# Patient Record
Sex: FEMALE | Race: OTHER | URBAN - METROPOLITAN AREA
[De-identification: names, ages, dates, MRNs, and addresses within clinical notes are randomized per-mention and may not be internally consistent; named-entity substitution may affect disease eponyms.]

---

## 2022-08-22 ENCOUNTER — OFFICE VISIT (OUTPATIENT)
Dept: OBGYN CLINIC | Facility: CLINIC | Age: 27
End: 2022-08-22
Payer: COMMERCIAL

## 2022-08-22 VITALS
HEIGHT: 64 IN | DIASTOLIC BLOOD PRESSURE: 58 MMHG | SYSTOLIC BLOOD PRESSURE: 110 MMHG | WEIGHT: 146 LBS | BODY MASS INDEX: 24.92 KG/M2

## 2022-08-22 DIAGNOSIS — B96.89 BACTERIAL VAGINOSIS: ICD-10-CM

## 2022-08-22 DIAGNOSIS — N76.0 BACTERIAL VAGINOSIS: ICD-10-CM

## 2022-08-22 DIAGNOSIS — B37.9 YEAST INFECTION: ICD-10-CM

## 2022-08-22 DIAGNOSIS — N89.8 VAGINAL DISCHARGE: Primary | ICD-10-CM

## 2022-08-22 LAB — TRICHOMONAS SCREEN: NEGATIVE

## 2022-08-22 PROCEDURE — 87808 TRICHOMONAS ASSAY W/OPTIC: CPT | Performed by: OBSTETRICS & GYNECOLOGY

## 2022-08-22 PROCEDURE — 99203 OFFICE O/P NEW LOW 30 MIN: CPT | Performed by: OBSTETRICS & GYNECOLOGY

## 2022-08-22 PROCEDURE — 87205 SMEAR GRAM STAIN: CPT | Performed by: OBSTETRICS & GYNECOLOGY

## 2022-08-22 PROCEDURE — 3008F BODY MASS INDEX DOCD: CPT | Performed by: OBSTETRICS & GYNECOLOGY

## 2022-08-22 PROCEDURE — 87106 FUNGI IDENTIFICATION YEAST: CPT | Performed by: OBSTETRICS & GYNECOLOGY

## 2022-08-22 PROCEDURE — 3078F DIAST BP <80 MM HG: CPT | Performed by: OBSTETRICS & GYNECOLOGY

## 2022-08-22 PROCEDURE — 3074F SYST BP LT 130 MM HG: CPT | Performed by: OBSTETRICS & GYNECOLOGY

## 2022-08-22 RX ORDER — FLUCONAZOLE 150 MG/1
150 TABLET ORAL
Qty: 3 TABLET | Refills: 0 | Status: SHIPPED | OUTPATIENT
Start: 2022-08-22

## 2022-08-22 RX ORDER — MULTIVITAMIN
1 CAPSULE ORAL DAILY
COMMUNITY

## 2022-08-22 RX ORDER — METRONIDAZOLE 500 MG/1
500 TABLET ORAL 2 TIMES DAILY
Qty: 14 TABLET | Refills: 0 | Status: SHIPPED | OUTPATIENT
Start: 2022-08-22 | End: 2022-08-29

## 2022-08-24 LAB
GENITAL VAGINOSIS SCREEN: NEGATIVE
TRICHOMONAS SCREEN: NEGATIVE

## 2024-04-04 NOTE — PATIENT INSTRUCTIONS
Baking soda Sitz bath:  Add 4-5 tablespoons of baking soda to a bath and soak for 10 minutes up to three times a day.  If you have a \"sitz bath\" instead of a full bath tub, add only 2 tablespoons of baking soda.

## 2024-04-04 NOTE — PROGRESS NOTES
RETURN GYN OFFICE VISIT  [facility]    CHIEF COMPLAINT:    Chief Complaint   Patient presents with    Vaginal Problem     Pt states she's tried using vaginal probiotics, over the counter medication and nothing seems to work, states she just finished her menstrual and believes the yeast infection has cleared up but wants to f/u       HISTORY OF PRESENT ILLNESS:    SOLO is a 29 year old female  here for   Vaginal discharge  All the time - sometimes green, sometimes white - sometimes creamy, sometimes chunky  + odor - sour / musty    Sometimes gets itchy / irritated - tries to wipe with a wet cloth to keep as clean as possible.    No bleeding.   No sex since November - sometimes a little pink with wiping after.    Just finished her period and feels ok now, doesn't really see much of the discharge today. No itching or irritation.    REVIEW OF SYSTEMS:   CONSTITUTIONAL:  negative for fevers, chills, sweats and fatigue  GASTROINTESTINAL:  negative for nausea, vomiting, blood in stool, constipation, diarrhea and abdominal pain  GENITOURINARY: negative for no dysuria, urgency or frequency; no urinary incontinence; no hematuria  SKIN:  negative for  rash, skin lesion and pruritus  ENDOCRINE:  negative for acne, fatigue, weight gain and weight loss  BEHAVIOR/PSYCH:  negative for depressed mood, anhedonia and anxiety    CURRENT MEDICATIONS:      Current Outpatient Medications:     fluconazole 150 MG Oral Tab, Take 1 tablet (150 mg total) by mouth every third day as needed., Disp: 3 tablet, Rfl: 0    Multiple Vitamin (MULTIVITAMINS) Oral Cap, Take 1 capsule by mouth daily., Disp: , Rfl:     PAST MEDICAL, SOCIAL AND FAMILY HISTORY:    Past Medical History:   Diagnosis Date    Patient denies medical problems      Past Surgical History:   Procedure Laterality Date    PATIENT DENIES ANY SURGICAL HISTORY       Family History   Problem Relation Age of Onset    No Known Problems Father     Other (Other) Mother          thyroid issue.    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      CONSTITUTIONAL:  Awake, alert, cooperative, no apparent distress  EYES: sclera clear and conjunctiva normal  GASTROINTESTINAL:  soft, non-distended, non-tender, no masses palpated  GENITAL/URINARY:    External Genitalia:  General appearance; normal, Hair distribution; normal, Lesions absent   Urethral Meatus:  Lesions absent, Prolapse absent  Bladder:  Tenderness absent, Cystocele absent  Vagina:  Discharge creamy with no noted odor, Lesions absent, Pelvic support normal  Cervix:  Lesions absent, Discharge absent, Tenderness absent  Uterus:  Size normal, Masses absent, Tenderness absent  Adnexa:  Masses absent, Tenderness absent  Anus/Perineum:  Lesions absent  SKIN:  No rashes  PSYCH:  Affect Normal      ASSESSMENT AND PLAN:  1. Vaginal discharge  - swab collected  - rx for fluconazole to have on hand if itching returns  - recommend baking soda sitz baths while waiting for swab results.  - YAEL VAGINAL ID; Future        Cassidy Tripp,

## 2024-04-08 NOTE — TELEPHONE ENCOUNTER
LMTCB    +VIKOR:  Candida albicans: 0.82%  Candid parapsilosis, tropicalis, albicans: 0.82%    Pt treated with:  Diflucan x2

## 2024-08-10 NOTE — ED PROVIDER NOTES
Patient Seen in: [facility]      History     Chief Complaint   Patient presents with    Urinary Symptoms     Stated Complaint: Urinary Symptoms - Sharp pain when urinating & foul odor    Subjective:   28 y/o female with unremarkable medical history complains of dysuria, pressure, urinary frequency and left flank pain for the past 3 days.  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   Gastrointestinal:  Negative for abdominal pain, nausea and vomiting.   Genitourinary:  Positive for dysuria and flank pain. Negative for frequency, hematuria and urgency.   All other systems reviewed and are negative.      Positive for stated Chief Complaint: Urinary Symptoms    Other systems are as noted in HPI.  Constitutional and vital signs reviewed.      All other systems reviewed and negative except as noted above.    Physical Exam     ED Triage Vitals [08/10/24 0925]   /63   Pulse 67   Resp 18   Temp 97.9 °F (36.6 °C)   Temp src Temporal   SpO2 100 %   O2 Device None (Room air)       Current Vitals:   Vital Signs  BP: 110/63  Pulse: 67  Resp: 18  Temp: 97.9 °F (36.6 °C)  Temp src: Temporal    Oxygen  Therapy  SpO2: 100 %  O2 Device: None (Room air)            Physical Exam  Vitals and nursing note reviewed.   Constitutional:       Appearance: Normal appearance.   Cardiovascular:      Rate and Rhythm: Normal rate and regular rhythm.   Pulmonary:      Effort: Pulmonary effort is normal.      Breath sounds: Normal breath sounds.   Abdominal:      General: Bowel sounds are normal.      Tenderness: There is no abdominal tenderness. There is no right CVA tenderness or left CVA tenderness.   Skin:     General: Skin is warm and dry.      Capillary Refill: Capillary refill takes less than 2 seconds.   Neurological:      Mental Status: She is alert and oriented to person, place, and time.               ED Course     Labs Reviewed   [facility] POCT URINALYSIS DIPSTICK - Abnormal; Notable for the following components:       Result Value    Blood, Urine Trace-Intact (*)     Leukocyte esterase urine Trace (*)     All other components within normal limits   POCT PREGNANCY URINE - Normal   URINE CULTURE, ROUTINE                      MDM                                         Medical Decision Making  Patient is well-appearing. In NAD  Abdomen benign.  No CVA tenderness present on exam.  blood and leukocytes esterase were present in urine dip.  Urine culture pending.    RX Bactrim and Diflucan. Patient has Jainism restrictions that limit use of capsules due to possible gelatin. Bactrim tablets sent   I discussed differentials with patient including but not limited to OAB vs UTI vs pyelonephritis vs renal calculi.     F/U with PCP. Return/ED precautions discussed.      Problems Addressed:  Acute cystitis without hematuria: acute illness or injury    Amount and/or Complexity of Data Reviewed  Labs: ordered. Decision-making details documented in ED Course.        Disposition and Plan     Clinical Impression:  1. Acute cystitis without hematuria         Disposition:  Discharge  8/10/2024 10:02 am    Follow-up:  Ave Spencer MD  [address] [place] [address]  [address] [address]  [phone]      or follow up with your Primary Doctor          Medications Prescribed:  Discharge Medication List as of 8/10/2024  9:57 AM

## 2024-08-28 NOTE — TELEPHONE ENCOUNTER
Received a call from patient requesting to speak with a nurse for a possible UTI  & yeast infection .  Also patient will like to ask if she can be prescribe fluconazole 150 MG Oral Tab .    Please assist .

## 2024-08-28 NOTE — TELEPHONE ENCOUNTER
RN spoke with pt. Pt feels like her UTI symptoms have resolved without antibiotic treatment, but she has cottage cheese discharge and says that she feels like the skin on/near her vaginal opening is cracking or breaking. RN told pt she needs to come in for a self-swab so she can be treated appropriately. RN sked RN appt for tomorrow, 8/29. Pt verbalized understanding and agreed with plan of care.

## 2024-08-29 NOTE — PROGRESS NOTES
Bisi Maldonado, JEYSON BYRNES    8/28/24  2:23 PM  Note     RN spoke with pt. Pt feels like her UTI symptoms have resolved without antibiotic treatment, but she has cottage cheese discharge and says that she feels like the skin on/near her vaginal opening is cracking or breaking. RN told pt she needs to come in for a self-swab so she can be treated appropriately. RN sked RN appt for tomorrow, 8/29. Pt verbalized understanding and agreed with plan of care.        Pt here for nurse visit for self vaginal swab. This MA provided pt instructions for self vaginal swab. This Ma collected swab from patient. Swab was labeled and sent to [facility]. Pt aware to allow 7-10 business days for test results. Pt voiced understanding.

## 2024-09-05 NOTE — TELEPHONE ENCOUNTER
Pt contacted,  and name verified. Pt provided lab VIKOR results and message from provider. Medications sent.

## 2024-09-09 NOTE — PROGRESS NOTES
RETURN GYN OFFICE VISIT  [facility]    CHIEF COMPLAINT:    Chief Complaint   Patient presents with    Vaginal Problem     Pt states was prescribed medication for BV; states she feels better, wants to f/u on trying to conceive.        HISTORY OF PRESENT ILLNESS:    SOLO is a 29 year old female  here for   Took medicine for bv on Friday last week  Had off odor, cottage cheese yellow-twan green discharge with some skin breaking  This has all improved.    Had ureaplasma or something, and was given a pill for .    Patient's last menstrual period was 2024 (exact date).  Monthly / regular, skipped a period in   4-5 days, moderate flow, + cramps moderate on first day    No bleeding between periods  Cannot tell when ovulating    Occasional bleeding after sex, some discomfort with deep penetration.    Exercise: once per week  Diet: moderate - not excellent but not horrible  Mood: normal      REVIEW OF SYSTEMS:   CONSTITUTIONAL:  negative for fevers, chills, sweats and fatigue  GASTROINTESTINAL:  negative for nausea, vomiting, blood in stool, constipation, diarrhea and abdominal pain  GENITOURINARY: negative for no dysuria, urgency or frequency; no urinary incontinence; no hematuria  SKIN:  negative for  rash, skin lesion and pruritus  ENDOCRINE:  negative for acne, fatigue, weight gain and weight loss  BEHAVIOR/PSYCH:  negative for depressed mood, anhedonia and anxiety    CURRENT MEDICATIONS:      Current Outpatient Medications:     azithromycin 500 MG Oral Tab, Take 2 tablets (1,000 mg total) by mouth daily., Disp: 4 tablet, Rfl: 0    metRONIDAZOLE (FLAGYL) 500 MG Oral Tab, Take 1 tablet (500 mg total) by mouth in the morning and 1 tablet (500 mg total) before bedtime. Do all this for 7 days. Avoid alcohol while taking this medication.., Disp: 14 tablet, Rfl: 0    fluconazole (DIFLUCAN) 150 MG Oral Tab, Take 1 tablet by mouth now. Take second dose on last day of antibiotic treatment., Disp: 2  tablet, Rfl: 0    fluconazole 150 MG Oral Tab, Take 1 tablet (150 mg total) by mouth every third day as needed., Disp: 3 tablet, Rfl: 0    Multiple Vitamin (MULTIVITAMINS) Oral Cap, Take 1 capsule by mouth daily., Disp: , Rfl:     PAST MEDICAL, SOCIAL AND FAMILY HISTORY:    Past Medical History:    Patient denies medical problems     Past Surgical History:   Procedure Laterality Date    Patient denies any surgical history       Family History   Problem Relation Age of Onset    No Known Problems Father     Other (Other) Mother         thyroid issue.    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      CONSTITUTIONAL:  Awake, alert, cooperative, no apparent distress  EYES: sclera clear and conjunctiva normal  GASTROINTESTINAL:  soft, non-distended, non-tender, no masses palpated  GENITAL/URINARY:    External Genitalia:  General appearance; normal, Hair distribution; normal, Lesions absent   Urethral Meatus:  Lesions absent, Prolapse absent  Bladder:  Tenderness absent, Cystocele absent  Vagina:  Discharge absent, Lesions absent, Pelvic support normal  Cervix:  Lesions absent, Discharge absent, Tenderness absent  Uterus:  Size normal, Masses absent, Tenderness absent  Adnexa:  Masses absent, Tenderness absent  Anus/Perineum:  Lesions absent  SKIN:  No rashes  PSYCH:  Affect Normal      ASSESSMENT AND PLAN:  1.  Screening for cervical cancer  - ThinPrep PAP Smear; Future  - Hpv Dna  High Risk , Thin Prep Collect; Future  - ThinPrep PAP Smear  - Hpv Dna  High Risk , Thin Prep Collect    2. Encounter for preconception consultation  - reviewed preconception guidance - healthy exercise/diet, avoidance of substance use.  - recommend using period-tracking tavares to help track ovulation.  - if not pregnant in 1 year, RTC for additional testing.        Cassidy Tripp, DO

## 2024-11-07 NOTE — PROGRESS NOTES
[facility]  Obstetrics and Gynecology    Chief Complaint:   Chief Complaint   Patient presents with    Pregnancy    Vaginal Problem     Pt states yellowish green discharge       Subjective:     Luisa Ulloa is a 29 year old ,female, Patient's last menstrual period was 2024 (exact date). presents with missed menses and positive pregnancy test.   This is a planned pregnancy. Partner is involved.    Feeling nauseated but not vomiting frequently.    [place] discharge, feels sensitive / uncomfortable      Review of Systems:  General: no complaints  Eyes: no complaints  Respiratory: no complaints  Cardiovascular: no complaints  GI: no complaints  : no complaints See HPI  Hematological/lymphatic: no complaints  Breast: no complaints  Psychiatric: no complaints  Endocrine:no complaints  Neurological: no complaints  Immunological: no complaints  Musculoskeletal:no complaints    OB History    Para Term  AB Living   1 0 0 0 1 0   SAB IAB Ectopic Multiple Live Births   0 0 0 0 0       Past Medical History:    Patient denies medical problems       Past Surgical History:   Procedure Laterality Date    Patient denies any surgical history         Social History     Socioeconomic History    Marital status:    Occupational History    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    Asthma Mother     Hypertension Mother     Hypertension Sister          Current Outpatient Medications:     Prenatal MV-Min-Fe  Fum-FA-DHA (PRENATAL 1 OR), Take by mouth., Disp: , Rfl:     Terconazole 80 MG Vaginal Suppos, Place 1 suppository (80 mg total) vaginally nightly for 3 doses., Disp: 3 each, Rfl: 0    azithromycin 500 MG Oral Tab, Take 2 tablets (1,000 mg total) by mouth daily. (Patient not taking: Reported on 11/7/2024), Disp: 4 tablet, Rfl: 0    fluconazole (DIFLUCAN) 150 MG Oral Tab, Take 1 tablet by mouth now. Take second dose on last day of antibiotic treatment. (Patient not taking: Reported on 11/7/2024), Disp: 2 tablet, Rfl: 0    fluconazole 150 MG Oral Tab, Take 1 tablet (150 mg total) by mouth every third day as needed. (Patient not taking: Reported on 11/7/2024), Disp: 3 tablet, Rfl: 0    Multiple Vitamin (MULTIVITAMINS) Oral Cap, Take 1 capsule by mouth daily. (Patient not taking: Reported on 11/7/2024), Disp: , Rfl:       Health maintenance:  Health Maintenance   Topic Date Due    DTaP,Tdap,and Td Vaccines (1 - Tdap) Never done    Annual Depression Screening  01/01/2024    Tobacco Cessation Counseling  Never done    COVID-19 Vaccine (1 - 2023-24 season) Never done    Influenza Vaccine (1) Never done    Annual Physical  10/13/2024    Pap Smear  09/09/2027    Pneumococcal Vaccine: Birth to 64yrs  Aged Out        Objective:     Vitals:    11/07/24 1226   BP: 108/58   Weight: 165 lb (74.8 kg)   Height: 64\"         Body mass index is 28.32 kg/m².    GENERAL: well developed, well nourished, in no apparent distress, alert and orientated X 3  PSYCH: mood and affect stable   SKIN: no rashes, no lesions  HEENT: normal  LUNGS: respiration unlabored  CARDIOVASCULAR: no peripheral edema or varicosities, skin warm and dry  ABDOMEN: Soft, non distended; non tender, no masses  GYNE/:   External Genitalia: normal, no lesions, good perineal support  Urethra: meatus normal   Bladder: well supported  Vagina: normal mucosa, no lesions,  discharge thick and chunky with a green tint  Uterus:  uterus feels approx 10-12 wks, no FHT on  doppler, mobile, nontender  Cervix: normal os, no lesions or bleeding  Adnexa:normal size, bilaterally nontender, no palpable masses  Cul-de-sac: normal  R/V: normal perineum, no hemorrhoids  EXTREMITIES:  Nontender without edema    Labs:  POC urine pregnancy test: Positive      Assessment:     Luisa Ulloa is a 29 year old  female who presents for missed menses and positive pregnancy test    Patient Active Problem List   Diagnosis    Recurrent low back pain    Annual physical exam    Acute cystitis without hematuria         Plan:       ICD-10-CM    1. Pregnancy examination or test, positive result (HCC)  Z32.01 Urine Preg Test [03202]     US OB 1st Trimester Abdominal <14 wks [93791]      2. Yeast infection  B37.9         Missed menses  - positive pregnancy test  - US ordered, LMP is unsure   - Pt counseled on safety, diet, exercise, caffiene, tobacco, ETOH, sexual activity, ER precautions, diet, exercise, appropriate otc medication use, substance abuse   - Counseled on taking a PNV with folic acid   - advised to follow up to establish prenatal care - referred for RN education visit  - SAB precautions provided   - d/w nausea and vomiting in pregnancy including vitamin B 6 and unisom     1. Pregnancy examination or test, positive result (HCC)  - Urine Preg Test [20286]  - US OB 1st Trimester Abdominal <14 wks [99045]; Future    2. Yeast infection  - terconazole suppositories sent      Medications    Prenatal MV-Min-Fe Fum-FA-DHA (PRENATAL 1 OR)    Terconazole 80 MG Vaginal Suppos     All of the findings and plan were discussed with the patient.  She notes understanding and agrees with the plan of care.  All questions were answered to the best of my ability at this time.    RTC in 1-2 weeks for us and rn education, sooner if needed     DO PADILLA Peters

## 2024-12-12 NOTE — PROGRESS NOTES
Pt is a   here today for RN OB Education.       Pregnancy Confirmation apt with:  with RD    LMP:     US:  (11w4d)    Working SUZANNE: 25    Pre  BMI: 26.59      Medical Hx significant for: Denies    Obstetrical Hx significant for: AB x1 ()    Surgical Hx significant for: Lasik    EPDS score: 1    Early GTT screening: does not meet criteria    Preeclampsia prevention screening: does not meet criteria.     OUD Screening: Patient has answered NO to 5p questions and has no  risk factors.     Patient given \"What Pregnant Women Need to Know\" handout.     Educational material reviewed with patient: Prenatal care, nutrition, weight gain recommendations, travel, exercise, intercourse, pregnancy changes, safe medications, pregnancy and work, fetal movement, labor and  labor, warning signs, food safety, tdap, cord blood, breastfeeding, circumcision, and Group B strep.     Pt agrees to blood transfusion if needed: Yes    PN labs ordered: Yes     Optional genetic screening discussed.  Pt desires Prequel and declines Foresight.    [facility] Media Policy: Reviewed and verbalized understanding.     NOB appt:  with NASIMA    Lab appt:

## 2024-12-23 NOTE — TELEPHONE ENCOUNTER
Normal prequel and gender tba      Called pt informed of normal prequel, pt desires friend to know gender.    Called Stacey and informed of gender, agrees.

## 2025-01-16 NOTE — PROGRESS NOTES
RETURN OB VISIT    Luisa Ulloa is a 29 year old  at 20w4d presenting for return OB visit. Today she reports recurrent yeast infection symptoms with occasional spotting. She reports thick clumpy white discharge and a feeling of vaginal irritation, especially during intercourse. She has been using OTC monistat suppositories, most recently this morning. No vaginal bleeding, cramping, unusual odor.     Will defer vaginitis swab as patient self-treated this morning. Advised patient to reach out if symptoms unresolved after current course of Monistat.     Declines flu shot at this time but will consider it - discussed recommendation to prevent serious illness related to influenza infection. Discussed that pregnant women are more likely to become seriously ill and require hospitalization from the flu.      S/p anatomy ultrasound today  1hr GTT at next visit     Follow up in 4wk    Carol Ann Patricia DO

## 2025-02-25 NOTE — ED INITIAL ASSESSMENT (HPI)
Pt presents with cough x 4 days. No fever. Robitussin help, \"but cough returns\".     Pt reports chronic yeast infections. Pt reports greenish discharge x 1 week. Pt requested to be tested for yeast infection.     No concern for STI.

## 2025-02-25 NOTE — ED PROVIDER NOTES
Patient Seen in: [facility]      History     Chief Complaint   Patient presents with    Cough    Eval-G    Pregnancy     Stated Complaint: cough, back pain    Subjective:   30-year-old female G2, P0 approximately 26 weeks pregnant presents with complaints of nonproductive cough and chest soreness x 4 days.  Has been taking Robitussin which helps.  No fever/chills, shortness of breath, abdominal pain, back pain.  Patient also complains of thick greenish vaginal discharge x 1 week.  Patient states has history of chronic yeast infection and it is consistent with her yeast infections in the past.  Patient is not concerned for STIs.  Requesting testing for yeast.  No abdominal or pelvic pain, vaginal bleeding.  being seen for cold symptoms                Objective:     Past Medical History:    Patient denies medical problems              Past Surgical History:   Procedure Laterality Date    Lasik  2021    Patient denies any surgical history                  Social History     Socioeconomic History    Marital status:    Occupational History    Occupation: passenger service [employer]   Tobacco Use    Smoking status: Never    Smokeless tobacco: Never   Vaping Use    Vaping status: Every Day    Substances: Nicotine   Substance and Sexual Activity    Alcohol use: Never    Drug use: Not Currently    Sexual activity: Not Currently   Other Topics Concern    Blood Transfusions No   Social History Narrative    Lives with Parents      Social Drivers of Health     Food Insecurity: No Food Insecurity (12/19/2024)    Food Insecurity     Food Insecurity: Never true   Transportation Needs: No Transportation Needs (12/19/2024)    Transportation Needs     Lack of Transportation: No   Stress: No Stress Concern Present (12/19/2024)    Stress     Feeling of Stress : No   Housing Stability: Low Risk  (12/19/2024)    Housing Stability     Housing Instability: No              Review of Systems   Constitutional:   Negative for chills and fever.   HENT:  Negative for congestion, rhinorrhea and sore throat.    Respiratory:  Positive for cough. Negative for shortness of breath.    Cardiovascular:  Positive for chest pain (soreness \"like when a muscle is sore\").   Gastrointestinal:  Negative for abdominal pain, diarrhea, nausea and vomiting.   Genitourinary:  Positive for vaginal discharge. Negative for dysuria and vaginal bleeding.   Neurological:  Negative for headaches.   All other systems reviewed and are negative.      Positive for stated complaint: cough, back pain  Other systems are as noted in HPI.  Constitutional and vital signs reviewed.      All other systems reviewed and negative except as noted above.    Physical Exam     ED Triage Vitals [02/25/25 1053]   /89   Pulse 80   Resp 20   Temp 98 °F (36.7 °C)   Temp src Oral   SpO2 99 %   O2 Device None (Room air)       Current Vitals:   Vital Signs  BP: 115/89  Pulse: 80  Resp: 20  Temp: 98 °F (36.7 °C)  Temp src: Oral    Oxygen Therapy  SpO2: 99 %  O2 Device: None (Room air)        Physical Exam  Vitals and nursing note reviewed. Exam conducted with a chaperone present (Connie Lyon).   Constitutional:       General: She is not in acute distress.     Appearance: Normal appearance. She is not ill-appearing.   HENT:      Head: Normocephalic.      Right Ear: Tympanic membrane and external ear normal.      Left Ear: Tympanic membrane and external ear normal.      Nose: Nose normal.      Mouth/Throat:      Mouth: Mucous membranes are moist.      Pharynx: Oropharynx is clear. Uvula midline.      Tonsils: No tonsillar exudate.   Cardiovascular:      Rate and Rhythm: Normal rate and regular rhythm.   Pulmonary:      Effort: Pulmonary effort is normal.      Breath sounds: Normal breath sounds.   Genitourinary:     Vagina: Vaginal discharge present.      Comments: BUS normal - cervical os closed with moderate amount thick, clumpy, greenish/white discharge.  no  CMT  Musculoskeletal:         General: Normal range of motion.      Cervical back: Normal range of motion and neck supple.   Skin:     General: Skin is warm.   Neurological:      Mental Status: She is alert and oriented to person, place, and time.   Psychiatric:         Behavior: Behavior is cooperative.             ED Course     Labs Reviewed   [facility] POCT URINALYSIS DIPSTICK - Abnormal; Notable for the following components:       Result Value    Protein urine 30 (*)     Urobilinogen urine 4.0 (*)     Leukocyte esterase urine Small (*)     All other components within normal limits   POCT FLU TEST - Abnormal; Notable for the following components:    POCT INFLUENZA A Positive (*)     All other components within normal limits    Narrative:     This assay is a rapid molecular in vitro test utilizing nucleic acid amplification of influenza A and B viral RNA.   VAGINITIS VAGINOSIS PCR PANEL - Abnormal; Notable for the following components:    Candida group Positive (*)     All other components within normal limits    Narrative:     This assay utilizes RT-PCR to detect the DNA of Gardnerella vaginalis, Lactobacillus spp. (L. crispatus and L. jensenii), Atopobium vaginae, Bacterial Vaginosis Associated Bacteria-2 (BVAB-2), and Megasphaera-1. An algorithm is used to determine if the targets detected represent a vaginal microbiome consistent with bacterial vaginosis or normal vaginal gato.  FDA approval was based on data in the 18 years and over population.                       MDM              Medical Decision Making  Patient is well-appearing.  Lungs clear bilaterally.  Respirations easy nonlabored  I discussed differentials with patient including but not limited to viral uri vs influenza vs pneumonia. Yeast vaginitis vs bacterial vaginosis  Chaperoned pelvic exam done.  Vaginal cultures pending  Rapid influenza positive.  Discussed with patient will defer chest x-ray as her lungs are clear and she has no shortness of  breath.  Patient agrees with plan  Discussed possible use of Tamiflu.  Patient declines at this time  Push fluids, cool mist humidifier, good hand washing  Rx miconazole vaginal cream  otc meds prn  Fu with PCP. Return/ ED precautions discussed      Problems Addressed:  Acute vaginitis: acute illness or injury  Influenza: acute illness or injury    Amount and/or Complexity of Data Reviewed  Labs: ordered. Decision-making details documented in ED Course.    Risk  OTC drugs.  Prescription drug management.        Disposition and Plan     Clinical Impression:  1. Influenza    2. Acute vaginitis         Disposition:  Discharge  2/25/2025 11:55 am    Follow-up:  Cassidy Tripp, DO  [address] [place] [address]  [phone]                Medications Prescribed:  Discharge Medication List as of 2/25/2025 11:46 AM              Supplementary Documentation:

## 2025-03-13 NOTE — PROGRESS NOTES
30 year old  at 28w4d     Contractions: No  VB: No  LOF: No  Fetal movement: Yes    Return OB  Pre-Dc Care: UTD.   - unsure about tdap shot, will consider for next appt  - order placed for EFW US @ 32 wks  Patient Active Problem List   Diagnosis    Recurrent low back pain    Supervision of normal pregnancy (HCC)       - Return to clinic in: 2 weeks    Cassidy Tripp, DO

## 2025-03-24 NOTE — PROGRESS NOTES
Doing well. No OB complaints. +FM.   Patient noted she desires female only providers for her care and delivery.   I reviewed that it maybe possible for a midwife to deliver her baby if I am on call but if she has any medical reasons she would risk out of midwife care then I will be the only option. Patient understood.     Her and her partner requested that a female listen to her FHR today. MA doppler was 123 BPM.     [place] 2 weeks with ultrasound prior to.     Dr. Dayne Rodney MD    [facility]     This note was created by [employer] voice recognition. Errors in content may be related to improper recognition by the system; efforts to review and correct have been done but errors may still exist. Please be advised the primary purpose of this note is for me to communicate medical care. Standard sentence structure is not always used. Medical terminology and medical abbreviations may be used. There may be grammatical, typographical, and automated fill ins that may have errors missed in proofreading.

## 2025-03-25 NOTE — TELEPHONE ENCOUNTER
Called patient and informed we don't process forms for \"Parking Placard\" in the forms department. Patient requested forms be sent back via [employer].  Request completed.

## 2025-04-10 NOTE — PROGRESS NOTES
30 year old  at 32w4d     Contractions: No  VB: No  LOF: No  Fetal movement: Yes    Return OB  Pre-Dc Care: UTD.     Growth ultrasound today  2054g (4lb8oz) ± 300g 50.0%    Patient Active Problem List   Diagnosis    Recurrent low back pain    Supervision of normal pregnancy (HCC)    Refusal of treatment for reasons of Muslim       - Return to clinic in: 2 weeks    Diandra Tesfaye MD

## 2025-04-22 NOTE — PROGRESS NOTES
30 year old  at 34w2d     Contractions: No  VB: No  LOF: No  Fetal movement: Yes    Return OB  Pre-Dc Care: UTD.  GBS next visit   HIV and trep orders placed  Patient Active Problem List   Diagnosis    Recurrent low back pain    Supervision of normal pregnancy (HCC)    Refusal of treatment for reasons of Sabianism       - Return to clinic in: 2 weeks    Diandra Tesfaye MD

## 2025-05-09 NOTE — PROGRESS NOTES
RETURN OB VISIT    Luisa Ulloa is a 30 year old  at 36w5d presenting for return OB visit. Today she reports no contractions, vaginal bleeding or leaking fluid. Baby is moving well.     Continues to struggle with yeast infection symptoms. She completed her most recent course of treatment 2 weeks ago. Shortly thereafter she began to have copious discharge. It is not particularly irritating but the vulvar skin starts to crack at times from frequent wiping. On exam the cervix is closed with no bleeding or leaking fluid, copious thick green-white discharge c/w yeast. Vaginitis swab collected. Prolonged treatment sent to pharmacy. Will follow up on culture results and adjust as needed.     GBS collected     Follow up in 1wk.     Carol Ann Patricia DO

## 2025-05-16 NOTE — PROGRESS NOTES
RETURN OB VISIT    Luisa Ulloa is a 30 year old  at 37w5d presenting for return OB visit. Today she reports no contractions, vaginal bleeding or leaking fluid. Baby is moving well.    Having some mild cramping and back pain, making it difficult to get comfortable at night. Was only able to  Rx for yeast infection today due to pharmacy issues. Suspect uterine irritability due to infection.     Labor precautions reviewed    Follow up in 1wk.     Carol Ann Patricia,

## 2025-05-23 NOTE — PROGRESS NOTES
30 year old  at 38w5d     Contractions: No  VB: No  LOF: No  Fetal movement: Yes    Return OB  Pre-Dc Care: UTD.  Schedule induction of labor for post EDC if still pregnancy next visit     Patient Active Problem List   Diagnosis    Recurrent low back pain    Supervision of normal pregnancy (HCC)    Refusal of treatment for reasons of Sabianist       - Return to clinic in: 1 weeks    Diandra Tesfaye MD

## 2025-05-30 NOTE — PROGRESS NOTES
RETURN OB VISIT    Luisa Ulloa is a 30 year old  at 39w5d presenting for return OB visit. Today she reports no contractions, vaginal bleeding or leaking fluid. Baby is moving well.    Yeast symptoms improved with Rx and OTC treatment, however, return of discharge today. Recommend continuing monistat until delivery.     SVE /-3, cephalic  Declines eIOL at this time, will re-assess at next visit, will need NST     Follow up in 1wk.     Carol Ann Patricia, DO

## 2025-06-09 NOTE — TELEPHONE ENCOUNTER
6/10 8pm    ----- Message from Carol Ann Patricia sent at 6/9/2025 11:26 AM CDT -----  Please schedule this pt for IOL in the first available slot for cytotec, she is 41w1d today. She has a strong preference for a female provider so please try to schedule her to she delivers before Nitti takes over on Thursday.

## 2025-06-11 NOTE — PROGRESS NOTES
Pt is a 30 year old female admitted to [facility].     Chief Complaint   Patient presents with    Scheduled Induction     Post Dates      Pt is  41w2d intra-uterine pregnancy.  History obtained, consents signed. Oriented to room, staff, and plan of care.

## 2025-06-11 NOTE — PROGRESS NOTES
[facility]  OB/GYN: Antepartum Progress Note     SUBJECTIVE:  Pt is a 30 year old  female at 41w3d who was admitted on 6/10/2025 for L for post EDC.   Feeling mild to mod contractions    OBJECTIVE:  Vital signs in last 24 hours:  Temp:  [97.7 °F (36.5 °C)-98.4 °F (36.9 °C)] 97.7 °F (36.5 °C)  Pulse:  [69-95] 78  Resp:  [16-18] 16  BP: (112-125)/(65-68) 112/68  Temps:   Temp Readings from Last 3 Encounters:   25 97.7 °F (36.5 °C) (Oral)   25 98 °F (36.7 °C) (Oral)   08/10/24 97.9 °F (36.6 °C) (Temporal)     Maximum Temperature: Temp (24hrs), Av.9 °F (36.6 °C), Min:97.7 °F (36.5 °C), Max:98.4 °F (36.9 °C)     BPs:  BP Readings from Last 3 Encounters:   25 112/68   25 122/74   25 120/72     Weights:  Wt Readings from Last 3 Encounters:   06/10/25 188 lb (85.3 kg)   25 188 lb 12.8 oz (85.6 kg)   25 186 lb 9.6 oz (84.6 kg)       Intake/Output:  Totals for last 24 hours:   No intake or output data in the 24 hours ending 25 0855    Uterus: nontender      Uterine Contraction: q 2 min   NST:baseline 125/ mod raina/ pos accels/ negative  decels    Labs:  Lab Results   Component Value Date    WBC 10.2 06/10/2025    HGB 11.2 06/10/2025    HCT 34.3 06/10/2025    .0 06/10/2025   '    ASSESSMENT/PLAN:  Pt is a 30 year old  female at 41w3d who was admitted on 6/10/2025 for induction of labor for post EDC. Continue cytotec. Consider Cooks balloon this PM.   Hospital Day 1    Active Problems:  Problem List[1]    Continue IOL     Diandra Albovias, MD    [facility]             [1]   Patient Active Problem List  Diagnosis    Recurrent low back pain    Supervision of normal pregnancy (HCC)    Refusal of treatment for reasons of Faith    Pregnant (HCC)

## 2025-06-11 NOTE — H&P
[facility]  Obstetrics and Gynecology  History & Physical    Luisa Ulloa Patient Status:  Inpatient    1995 MRN [id number]   Location [facility] Attending Cassidy Tripp DO   Hospital Day 0 PCP No primary care provider on file.     CC: Patient is here for induction of labor    SUBJECTIVE:    Luisa Ulloa is a 30 year old  female at 41w2d Estimated Date of Delivery: 25 who is being admitted for induction of labor. Her current obstetrical history is uncomplicated. Patient reports no complaints.     negative UCx.    negative VB.   negative LOF.    positive Fetal movement.   negative Nausea, Vomiting, headache, vision changes and RUQ/Epigastric pain.       SUZANNE Confirmation  LMP: Patient's last menstrual period was 2024 (exact date).  SUZANNE: 2025, by Ultrasound       Obstetric History:   OB History    Para Term  AB Living   2    1    SAB IAB Ectopic Multiple Live Births             # Outcome Date GA Lbr Kirill/2nd Weight Sex Type Anes PTL Lv   2 Current            1 AB 14             Past Medical History: Past Medical History[1]  Past Social History: Past Surgical History[2]  Family History: Family History[3]  Social History:   Social History     Tobacco Use    Smoking status: Never    Smokeless tobacco: Never   Substance Use Topics    Alcohol use: Never       Home Meds: Prescriptions Prior to Admission[4]  Allergies: Allergies[5]    OBJECTIVE:    Temp:  [98.4 °F (36.9 °C)] 98.4 °F (36.9 °C)  Pulse:  [95] 95  Resp:  [16] 16  BP: (125)/(65) 125/65  Body mass index is 32.27 kg/m².    General: AAO. NAD.   Lungs: Respirations non labored   CV: peripheral pulses +2 bilaterally   Abdomen: soft, nontender, nondistended, no abnormal masses, no epigastric pain and FHT present, gravid   Extremities: negative edema bilaterally, negative calf tenderness bilaterally    FHT: moderate variability/130s BPM / Positive  accelerations/Negative decelerations   TOCO: acontractile    SVE: fingertip / 20 / -3 (external os 1 cm, but internal os fingertip)    [place]: cephalic    Prenatal Labs Brief Review   Blood Type:   Lab Results   Component Value Date    ABO O 2024    RH Positive 2024     GBS:  Negative      Inpatient labs:  Lab Results   Component Value Date    WBC 10.2 06/10/2025    HGB 11.2 06/10/2025    HCT 34.3 06/10/2025    .0 06/10/2025       ASSESSMENT/ PLAN:    Luisa Ulloa is a 30 year old  female at 41w2d Estimated Date of Delivery: 25 who is being admitted for induction of labor.    Problem List[6]    1. Induction of Labor for post-EDC:   - admit with routine labs  - start cytotec vaginal q 3 hrs up to 8 doses  2. Fetal monitoring: CEFM, currently category 1  3. GBS: negative  4. Pain: medication or epidural if reuqested    Risks, benefits, alternatives and possible complications have been discussed in detail with the patient.  Pre-admission, admission, and post admission procedures and expectations were discussed in detail.  All questions answered, all appropriate consents signed at the Hospital. Admission is planned for today.     Cassidy Tripp, DO [facility]            [1]   Past Medical History:   Patient denies medical problems   [2]   Past Surgical History:  Procedure Laterality Date    Lasik  2021    Patient denies any surgical history     [3]   Family History  Problem Relation Age of Onset    No Known Problems Father     Lipids Mother     Other (Other) Mother         thyroid issue.    Asthma Mother     Hypertension Mother     Hypertension Sister     Psychiatric Sister         Depression   [4]   Medications Prior to Admission   Medication Sig Dispense Refill Last Dose/Taking    [] clotrimazole 2 % Vaginal Cream Place 1 Application vaginally nightly for 3 days. 21 g 0     Prenatal MV-Min-Fe Fum-FA-DHA (PRENATAL 1 OR) Take by mouth.      [5]   Allergies  Allergen  Reactions    Shrimp UNKNOWN     Unsure; dx via allergy testing   [6]   Patient Active Problem List  Diagnosis    Recurrent low back pain    Supervision of normal pregnancy (HCC)    Refusal of treatment for reasons of Anabaptism    Pregnant (HCC)

## 2025-06-12 NOTE — PROGRESS NOTES
Labor Progress Note  Subjective:   Patient comfortable with contractions.     Objective:   Vitals:    25 1500   BP: 127/56   Pulse: 65   Resp: 16   Temp: 98.1 °F (36.7 °C)        SVE: /-3  IUPC and FSE placed     FHR: Baseline 135 BPM, Moderate variability, no accelerations, early vs late appearing decelerations   [place]: contractions not picking up     Assessment and plan:   Patient is a 30 year old  at 41w4d here for induction of labor 2/2 post SUZANNE     Induction of labor   -SVE as above with IUPC and FSE placed.    -Continue pitocin.      Plan of care discussed with the patient.     Dr. Dayne Rodney MD    [facility]     This note was created by Dragon voice recognition. Errors in content may be related to improper recognition by the system; efforts to review and correct have been done but errors may still exist. Please be advised the primary purpose of this note is for me to communicate medical care. Standard sentence structure is not always used. Medical terminology and medical abbreviations may be used. There may be grammatical, typographical, and automated fill ins that may have errors missed in proofreading.

## 2025-06-12 NOTE — PROGRESS NOTES
Labor Progress Note  Subjective:   Patient comfortable with contractions. Reviewed IV pain medication this morning. Ok with me delivering baby and caring for her.     Objective:   Vitals:    25 1000   BP: 112/52   Pulse: 76   Resp:    Temp:         FHR: Baseline 125 BPM, Moderate variability, + accelerations, no decelerations   [place]: contractions Q 5 min     Assessment and plan:   Patient is a 30 year old  at 41w4d here for induction of labor 2/2 Post SUZANNE     Induction of labor    -Received Cytotec after starting closed.    -AROM this morning and started pitocin.   -Continue pitocin protocol#2. IUPC this afternoon evening if not changing SVE.      Plan of care discussed with the patient.     Dr. Dayne Rodney MD    [facility]     This note was created by [employer] voice recognition. Errors in content may be related to improper recognition by the system; efforts to review and correct have been done but errors may still exist. Please be advised the primary purpose of this note is for me to communicate medical care. Standard sentence structure is not always used. Medical terminology and medical abbreviations may be used. There may be grammatical, typographical, and automated fill ins that may have errors missed in proofreading.

## 2025-06-13 NOTE — PROGRESS NOTES
Labor Progress Note  Subjective:   Patient comfortable with contractions.     Objective:   Vitals:    25   BP: 112/43   Pulse: 76   Resp: 18   Temp:         SVE: 9/80/-1 per RN at 1920      FHR: Baseline 140 BPM, Moderate variability, + accelerations, early decelerations   [place]: contractions Q 2-5 min. IUPC contraction strength less than 200 montevideo units in 10 minutes.     Assessment and plan:   Patient is a 30 year old  at 41w4d here for induction of labor 2/2 post SUZANNE.      Induction of labor    -SVE as above.    -Recommended to consider epidural as a management option to allow for fetal decent and complete cervical dilation.   -Continue pitocin.      Plan of care discussed with the patient.     Dr. Dayne Rodney MD    [facility]     This note was created by [employer] voice recognition. Errors in content may be related to improper recognition by the system; efforts to review and correct have been done but errors may still exist. Please be advised the primary purpose of this note is for me to communicate medical care. Standard sentence structure is not always used. Medical terminology and medical abbreviations may be used. There may be grammatical, typographical, and automated fill ins that may have errors missed in proofreading.

## 2025-06-13 NOTE — DISCHARGE INSTRUCTIONS
Guidelines for Using a Nipple Shield    Refer to the ’s instructions. These are additional suggestions only.  This thin silicone nipple shield has been recommended to assist your baby to latch on to the breast or for protection of your nipples while your baby learns to breastfeed correctly.  Use of the shield is considered temporary, allowing you to begin breastfeeding your baby. Some infants have  successfully using the shield for longer periods, however, checking your infant’s weight regularly is recommended to assure adequate growth while using the nipple shield.    Cautions regarding nipple shield use:  The use of a nipple shield may decrease your milk supply and could decrease the amount of milk your baby receives.  Careful follow-up, including weight checks, with your lactation consultant and baby’s health care provider is important.   Do not use a different nipple shield.     Before feeding your baby:  Rinse the shield in warm water to make it          softer and easier to adhere to the breast.  Apply nipple shield correctly:               Hold the shield by the rim, turn it inside-out intermediate. Place the shield, centered over the                 nipple and flip the shield right side out, drawing your nipple and areola into the                shield as much as possible.  Massage breasts and if possible, hand express some breastmilk into the nipple shield.     Latching your baby on to the breast:  Stroke your baby’s lower lip with the nipple of the shield. Wait for your baby to open wide like a “yawn,” with the tongue down and out over the lower lip. Bring baby’s mouth directly over the shield. It may take a few attempts before your baby settles into a rhythmical suckling pattern.  After several minutes of regular swallowing at the breast, you may want to try to reattach your baby to your breast without the shield.  When your baby’s swallowing slows on the first side, repeat this process on  the other breast.   If needed, trickle drops of your expressed milk or formula onto the nipple to encourage your baby to latch on. If your baby becomes upset or has not latched on within 15 minutes, stop and feed your baby using another method.    Signs of correct, effective suckling include:  Your baby swallows with nearly every suck (this is called nutritive suckling: swallowing every 1-3 sucks)  Your baby sustains nutritive suck and swallow pattern for at least 15-30 minutes, offer both breasts at each feeding.  Your nipples are not painful during the feeding.  Your baby is content after most feedings.  Your baby has adequate diapers (at least 6-8 wet and 3-4 loose, yellow stools every 24 hours by the 4th day of life) AND gains at least 4-8 ounces per week (one-half to one ounce per day). Use the breastfeeding journal to record your baby’s feedings and diapers.    Is a supplement needed?  If your baby does not latch on, or swallows less than 15-30 minutes at a feeding - swallowing after most sucks (at least every 1-3 sucks), feed your baby additional expressed breastmilk or formula by  wide base slow flow bottle with 1 to 2 +oz to satisfaction.                 After feeding your baby:  Pump your breasts for 10-15 minutes using a hospital grade rental breast pump, after most daytime feedings. This may help maintain adequate milk supply while using the shield. If your baby is not latching on yet, pump at least 8-12 times each 24 hours.  Wash the nipple shield in hot soapy water, rinse and air dry. The shield may be boiled.     Follow-up is VERY important!  Let your baby’s health care provider know immediately if it is difficult for you to feed your baby or if the number of wet or stool diapers is inadequate.   Follow-up visits with your lactation consultant and baby’s health care provider are necessary when using the shield.   Your baby’s weight should be checked 1-2 times each week while using a nipple  shield.      FOCUS: MOM/BABY DISCHARGE    D: DISCHARGE ORDERS RECEIVED FROM PROVIDERS.    A: POSTPARTUM AND  DISCHARGE AVS'S GIVEN AND REVIEWED EXTENSIVELY, PRESCRIPTIONS/MEDICATIONS REVIEWED, AND DISCHARGE PAPERWORK SIGNED. VOCALIZED UNDERSTANDING. ID BANDS MATCHED. HUGS TAG REMOVED. PATIENT INFORMED WHEN TO MAKE FOLLOW UP APPOINTMENTS WITH PROVIDER AND PEDIATRICIAN.    R: PARENT(S) INTERACTING APPROPRIATELY WITH INFANT. VERBALIZED UNDERSTANDING OF FOLLOW UP INSTRUCTIONS. DISCHARGED IN STABLE CONDITION. INFANT DISCHARGED HOME IN CAR SEAT.         FOCUS: MOM/BABY DISCHARGE    D: DISCHARGE ORDERS RECEIVED FROM PROVIDERS.    A: POSTPARTUM AND  DISCHARGE AVS'S GIVEN AND REVIEWED EXTENSIVELY, PRESCRIPTIONS/MEDICATIONS REVIEWED, AND DISCHARGE PAPERWORK SIGNED. VOCALIZED UNDERSTANDING. ID BANDS MATCHED. HUGS TAG REMOVED. PATIENT INFORMED WHEN TO MAKE FOLLOW UP APPOINTMENTS WITH PROVIDER AND PEDIATRICIAN.    R: PARENT(S) INTERACTING APPROPRIATELY WITH INFANT. VERBALIZED UNDERSTANDING OF FOLLOW UP INSTRUCTIONS. DISCHARGED IN STABLE CONDITION. INFANT DISCHARGED HOME IN CAR SEAT.

## 2025-06-13 NOTE — PLAN OF CARE
Problem: PAIN - ADULT  Goal: Verbalizes/displays adequate comfort level or patient's stated pain goal  Description: INTERVENTIONS:  - Encourage pt to monitor pain and request assistance  - Assess pain using appropriate pain scale  - Administer analgesics based on type and severity of pain and evaluate response  - Implement non-pharmacological measures as appropriate and evaluate response  - Consider cultural and social influences on pain and pain management  - Manage/alleviate anxiety  - Utilize distraction and/or relaxation techniques  - Monitor for opioid side effects  - Notify MD/LIP if interventions unsuccessful or patient reports new pain  - Anticipate increased pain with activity and pre-medicate as appropriate  Outcome: Progressing     Problem: ANXIETY  Goal: Will report anxiety at manageable levels  Description: INTERVENTIONS:  - Administer medication as ordered  - Teach and rehearse alternative coping skills  - Provide emotional support with 1:1 interaction with staff  Outcome: Progressing     Problem: Patient Centered Care  Goal: Patient preferences are identified and integrated in the patient's plan of care  Description: Interventions:  - What would you like us to know as we care for you?   - Provide timely, complete, and accurate information to patient/family  - Incorporate patient and family knowledge, values, beliefs, and cultural backgrounds into the planning and delivery of care  - Encourage patient/family to participate in care and decision-making at the level they choose  - Honor patient and family perspectives and choices  Outcome: Progressing     Problem: Patient/Family Goals  Goal: Patient/Family Long Term Goal  Description: Patient's Long Term Goal:     Interventions:  -   - See additional Care Plan goals for specific interventions  Outcome: Progressing  Goal: Patient/Family Short Term Goal  Description: Patient's Short Term Goal:     Interventions:   -   - See additional Care Plan goals for  specific interventions  Outcome: Progressing     Problem: GENITOURINARY - ADULT  Goal: Absence of urinary retention  Description: INTERVENTIONS:  - Assess patient’s ability to void and empty bladder  - Monitor intake/output and perform bladder scan as needed  - Follow urinary retention protocol/standard of care  - Consider collaborating with pharmacy to review patient's medication profile  - Implement strategies to promote bladder emptying  Outcome: Progressing     Problem: POSTPARTUM  Goal: Long Term Goal:Experiences normal postpartum course  Description: INTERVENTIONS:  - Assess and monitor vital signs and lab values.  - Assess fundus and lochia.  - Provide ice/sitz baths for perineum discomfort.  - Monitor healing of incision/episiotomy/laceration, and assess for signs and symptoms of infection and hematoma.  - Assess bladder function and monitor for bladder distention.  - Provide/instruct/assist with pericare as needed.  - Provide VTE prophylaxis as needed.  - Monitor bowel function.  - Encourage ambulation and provide assistance as needed.  - Assess and monitor emotional status and provide social service/psych resources as needed.  - Utilize standard precautions and use personal protective equipment as indicated. Ensure aseptic care of all intravenous lines and invasive tubes/drains.  - Obtain immunization and exposure to communicable diseases history.  Outcome: Progressing  Goal: Optimize infant feeding at the breast  Description: INTERVENTIONS:  - Initiate breast feeding within first hour after birth.   - Monitor effectiveness of current breast feeding efforts.  - Assess support systems available to mother/family.  - Identify cultural beliefs/practices regarding lactation, letdown techniques, maternal food preferences.  - Assess mother's knowledge and previous experience with breast feeding.  - Provide information as needed about early infant feeding cues (e.g., rooting, lip smacking, sucking fingers/hand)  versus late cue of crying.  - Discuss/demonstrate breast feeding aids (e.g., infant sling, nursing footstool/pillows, and breast pumps).  - Encourage mother/other family members to express feelings/concerns, and actively listen.  - Educate father/SO about benefits of breast feeding and how to manage common lactation challenges.  - Recommend avoidance of specific medications or substances incompatible with breast feeding.  - Assess and monitor for signs of nipple pain/trauma.  - Instruct and provide assistance with proper latch.  - Review techniques for milk expression (breast pumping) and storage of breast milk. Provide pumping equipment/supplies, instructions and assistance, as needed.  - Encourage rooming-in and breast feeding on demand.  - Encourage skin-to-skin contact.  - Provide LC support as needed.  - Assess for and manage engorgement.  - Provide breast feeding education handouts and information on community breast feeding support.   Outcome: Progressing  Goal: Establishment of adequate milk supply with medication/procedure interruptions  Description: INTERVENTIONS:  - Review techniques for milk expression (breast pumping).   - Provide pumping equipment/supplies, instructions, and assistance until it is safe to breastfeed infant.  Outcome: Progressing  Goal: Appropriate maternal -  bonding  Description: INTERVENTIONS:  - Assess caregiver- interactions.  - Assess caregiver's emotional status and coping mechanisms.  - Encourage caregiver to participate in  daily care.  - Assess support systems available to mother/family.  - Provide /case management support as needed.  Outcome: Progressing     Problem: GASTROINTESTINAL - ADULT  Goal: Minimal or absence of nausea and vomiting  Description: INTERVENTIONS:  - Maintain adequate hydration with IV or PO as ordered and tolerated  - Nasogastric tube to low intermittent suction as ordered  - Evaluate effectiveness of ordered  antiemetic medications  - Provide nonpharmacologic comfort measures as appropriate  - Advance diet as tolerated, if ordered  - Obtain nutritional consult as needed  - Evaluate fluid balance  Outcome: Progressing  Goal: Maintains or returns to baseline bowel function  Description: INTERVENTIONS:  - Assess bowel function  - Maintain adequate hydration with IV or PO as ordered and tolerated  - Evaluate effectiveness of GI medications  - Encourage mobilization and activity  - Obtain nutritional consult as needed  - Establish a toileting routine/schedule  - Consider collaborating with pharmacy to review patient's medication profile  Outcome: Progressing  Goal: Maintains adequate nutritional intake (undernourished)  Description: INTERVENTIONS:  - Monitor percentage of each meal consumed  - Identify factors contributing to decreased intake, treat as appropriate  - Assist with meals as needed  - Monitor I&O, WT and lab values  - Obtain nutritional consult as needed  - Optimize oral hygiene and moisture  - Encourage food from home; allow for food preferences  - Enhance eating environment  Outcome: Progressing  Goal: Achieves appropriate nutritional intake (bariatric)  Description: INTERVENTIONS:  - Monitor for over-consumption  - Identify factors contributing to increased intake, treat as appropriate  - Monitor I&O, WT and lab values  - Obtain nutritional consult as needed  - Evaluate psychosocial factors contributing to over-consumption  Outcome: Progressing    Pain controlled currently with Toradol.   Voiding freely.   Fundus firm and midline.  Bleeding WDL.  Ambulating in room.  Intermittent n/v.  Interacting appropriately with infant.   Will continue plan of care.

## 2025-06-13 NOTE — OPERATIVE REPORT
Date of Procedure: 25    Preoperative Diagnosis: IUP at 41w5d, arrest of dilation, fetal intolerance to labor     Postoperative Diagnosis: Same - Delivered    Procedure:  Primary Low Transverse  Section (PLTCS)     Primary surgeon: Dayne Rodney MD     Assistant: Dr. Brenda Cha. The involvement of the assisting physician was necessary in order to provide aid in exposure/retraction, hemostasis, closure, and other intraoperative technical functions in order to facilitate me as the primary surgeon carry out a safe operation with optimized results/outcome.     Indications:  Patient is a 30 year old year old  at 41w5d who presented for induction of labor 2/2 post SUZANNE. She progressed to 9 cm dilated and was diagnosed with arrest of dilation and the baby started to have fetal intolerance to labor. The risks, benefits and alternatives were d/w patient including but not limited to risk of injury, infection, bleeding and subsequent  section deliveries. All questions and concerns were addressed. Patient provided verbal and written consent.     Surgical Findings: normal tubes and ovaries   Baby - male, apgars 7/8, wt 4100 grams    Anesthesia:Spinal    EBL: 900 cc  QBL: pending calculation     Procedure: The patient was prepped and draped in the usual sterile fashion with SCDs in place to decrease her risk of a DVT. A dose of antibiotics were given preoperatively to decrease her risk of infection. A time out was performed. After adequate level of anesthesia was ascertained, a Pfannenstiel incision was made with a scalpel and the incision was extended down to the level of the fascia. The fascia was incised and extended laterally with Victoria scissors.  The rectus muscles were  superiorly and inferiorly.  The peritoneal cavity was entered bluntly superiorly and extended laterally. The vesicouterine peritoneal fold was identified.  A low transverse incision was made with scalpel and extended using  blunt finger dissection. The fetal head was noted to be cephalic in occiput transverse position. The fetal head was brought towards the incision. Fundal pressure applied and fetal head delivered. Nuchal cord x 1. The remainder of the fetal body delivered without complication. The infant was vigorously crying. The cord was clamped and cut after 10 second delay. The infant was placed in the warmer to be evaluated by Neonatology who was present for delivery. Cord blood was obtained. The placenta was delivered intact with a 3 vessel cord. The pelvic organs were exteriorized and appeared to be normal. The uterine cavity was swept clean using a wet lap. The hysterotomy was closed using 0-Vicryl suture. A second layer of the same suture was placed. Good hemostasis noted. Re-inspection of the hysterotomy, peritomeum and rectus muscles was noted to be entirely hemostatic. The pelvic organs were replaced back into the pelvis. Good hemostasis was again noted. The pelvic gutters were cleared of a minimal amount of blood. The fascia was re-approximated with two 0-Vicry sutures in a running fashion meeting in the midline. The subcutaneous tissue was copiously irrigated and any bleeding cauterized. The skin was re-approximated with 3-0 Monocryl on Kayden needle.  The sponge and instrument counts were reported to me as being correct.  The patient tolerated the procedure and was stable to the recovery room.  The infant was stable to the recovery room.    Specimen:  Placenta    Drains: velazco to dependant drainage    Condition:   stable    Complications: None; patient tolerated the procedure well.      Dr. Dayne Rodney MD    [facility]     This note was created by [employer] voice recognition. Errors in content may be related to improper recognition by the system; efforts to review and correct have been done but errors may still exist. Please be advised the primary purpose of this note is for me to communicate medical care. Standard  sentence structure is not always used. Medical terminology and medical abbreviations may be used. There may be grammatical, typographical, and automated fill ins that may have errors missed in proofreading.

## 2025-06-13 NOTE — ANESTHESIA PROCEDURE NOTES
Epidural Block    Date/Time: 6/12/2025 9:25 PM    Performed by: Ayaka Schmidt MD  Authorized by: Ayaka Schmdit MD    General Information and Staff    Start Time:  6/12/2025 9:25 PM  End Time:  6/12/2025 9:47 PM  Anesthesiologist:  Ayaka Schmidt MD  Performed by:  Anesthesiologist  Patient Location:  OB  Site Identification: surface landmarks    Reason for Block: labor epidural    Preanesthetic Checklist: patient identified, IV checked, risks and benefits discussed, surgical consent, monitors and equipment checked, pre-op evaluation, timeout performed and anesthesia consent      Procedure Details    Patient Position:  Sitting  Prep: ChloraPrep    Monitoring:  Heart rate and continuous pulse ox  Approach:  Midline  Location:  L 3-4  Injection Technique:  REAL saline    Needle and Epidural Catheter    Needle Type:  Tuohy  Needle Gauge:  18 G  Needle Length:  3.5 in  REAL Depth:  5  Catheter Type:  End hole  Catheter Size:  20 G  Catheter at Skin Depth:  10  Test Dose:  Negative    Assessment      Additional Comments

## 2025-06-13 NOTE — PROGRESS NOTES
Pain well controlled. No heavy bleeding or pain. Tolerating diet    Patient Vitals for the past 24 hrs:   BP Temp Temp src Pulse Resp SpO2   06/13/25 0630 -- -- -- 69 -- 96 %   06/13/25 0603 -- -- -- 59 -- 98 %   06/13/25 0600 -- -- -- 68 -- (!) 89 %   06/13/25 0550 -- -- -- 71 -- 92 %   06/13/25 0545 139/76 -- -- 66 -- (!) 84 %   06/13/25 0530 151/87 98.6 °F (37 °C) Oral 69 16 90 %   06/13/25 0510 -- -- -- 85 -- 94 %   06/13/25 0500 -- -- -- 91 -- 95 %   06/13/25 0450 -- -- -- 81 -- 94 %   06/13/25 0440 -- -- -- 64 -- 91 %   06/13/25 0430 -- -- -- 64 -- 92 %   06/13/25 0420 -- -- -- 64 -- 90 %   06/13/25 0410 -- -- -- 81 -- 92 %   06/13/25 0400 -- -- -- 63 -- 91 %   06/13/25 0350 -- -- -- 64 -- 90 %   06/13/25 0340 -- -- -- 86 -- 96 %   06/13/25 0330 -- -- -- 68 -- (!) 88 %   06/13/25 0320 -- -- -- 73 -- 90 %   06/13/25 0310 -- -- -- 71 -- 90 %   06/13/25 0300 130/84 98.6 °F (37 °C) Oral 59 16 93 %   06/13/25 0220 -- -- -- -- -- 95 %   06/13/25 0210 135/80 -- -- 79 14 90 %   06/13/25 0200 141/79 -- -- 79 19 92 %   06/13/25 0150 135/81 -- -- 70 13 93 %   06/13/25 0140 129/85 -- -- 76 15 94 %   06/13/25 0132 130/80 -- -- 78 15 93 %   06/13/25 0130 130/80 -- -- 78 19 92 %   06/13/25 0120 133/82 -- -- 74 17 95 %   06/13/25 0110 -- 99.4 °F (37.4 °C) Oral -- -- --   06/13/25 0100 129/74 -- -- 84 19 97 %   06/13/25 0050 105/79 -- -- 83 17 98 %   06/13/25 0040 (!) 88/60 -- -- 93 23 90 %   06/13/25 0030 115/65 -- -- 89 15 98 %   06/13/25 0020 122/47 -- -- 92 16 97 %   06/12/25 2250 104/51 -- -- 73 -- 97 %   06/12/25 2240 -- -- -- 77 -- 99 %   06/12/25 2230 120/60 -- -- 80 -- 98 %   06/12/25 2220 117/52 -- -- 80 -- 99 %   06/12/25 2210 117/52 -- -- 79 -- 97 %   06/12/25 2202 105/44 -- -- 78 -- --   06/12/25 2201 -- -- -- 80 -- 97 %   06/12/25 2200 -- 99.9 °F (37.7 °C) Oral -- -- --   06/12/25 2156 112/51 -- -- 72 -- --   06/12/25 2151 103/51 -- -- 83 -- 97 %   06/12/25 2149 108/51 -- -- 88 -- --   06/12/25 2147 118/60 -- --  89 -- --   06/12/25 2146 -- -- -- 90 -- 99 %   06/12/25 2144 126/63 -- -- 81 -- --   06/12/25 2141 137/76 -- -- 95 -- 95 %   06/12/25 2140 145/77 -- -- 95 -- 95 %   06/12/25 1926 112/43 -- -- 76 18 --   06/12/25 1922 -- 97.7 °F (36.5 °C) Oral -- -- --   06/12/25 1900 129/61 -- -- 75 -- --   06/12/25 1800 118/61 -- -- 70 -- --   06/12/25 1730 125/63 -- -- 68 -- --   06/12/25 1700 109/59 97.9 °F (36.6 °C) Oral 64 16 --   06/12/25 1630 100/44 -- -- 70 -- --   06/12/25 1615 115/54 -- -- 74 -- --   06/12/25 1600 115/54 -- -- 74 -- --   06/12/25 1530 125/63 -- -- 71 -- --   06/12/25 1500 127/56 98.1 °F (36.7 °C) Oral 65 16 --   06/12/25 1430 102/51 -- -- 72 -- --   06/12/25 1415 144/73 -- -- 68 -- --   06/12/25 1400 144/73 -- -- 68 -- --   06/12/25 1330 121/72 98.1 °F (36.7 °C) Oral 69 16 --   06/12/25 1315 127/75 -- -- 68 -- --   06/12/25 1130 112/65 98.1 °F (36.7 °C) Oral 74 16 --   06/12/25 1000 112/52 -- -- 76 -- --   06/12/25 0815 97/52 -- -- 68 -- --     ABD: wound cdi, soft  EXT: no calf tenderness    Hgb 10.1    POD#0.5 c/s. Doing well.

## 2025-06-13 NOTE — ANESTHESIA POSTPROCEDURE EVALUATION
Patient: Luisa Ulloa    Procedure Summary       Date: 25 Room / Location: [facility] L+D OR  [facility] L+[facility] OR    Anesthesia Start:  Anesthesia Stop: 25    Procedure:  SECTION (Abdomen) Diagnosis: (arrest of dilation)    Surgeons: Dayne Rodney MD Anesthesiologist: Ayaka Schmidt MD    Anesthesia Type: general, epidural ASA Status: 2            Anesthesia Type: general, epidural    Vitals Value Taken Time   /60 25 00:17   Temp 98 25 00:17   Pulse 92 25 00:17   Resp 18 25 00:17   SpO2 99 25 00:17       EM AN Post Evaluation:   Patient Evaluated in PACU  Patient Participation: complete - patient participated  Level of Consciousness: awake and alert  Pain Score: 0  Pain Management: adequate  Airway Patency:patent  Yes    Nausea/Vomiting: none  Cardiovascular Status: acceptable  Respiratory Status: acceptable  Postoperative Hydration acceptable      Ayaka Schmidt MD  2025 12:17 AM   Pt attended cardiac rehab phase II exercise session.  Exercise and vitals documented in [facility] System.

## 2025-06-13 NOTE — PROGRESS NOTES
Labor Progress Note  Subjective:   Patient comfortable with contractions.     Objective:   Vitals:    25 192   BP: 112/43   Pulse: 76   Resp: 18   Temp:         SVE: 9/80/-1     FHR: Baseline 130 BPM, Moderate variability, + accelerations, early decelerations   [place]: contractions Q 2-3 min. Contractions appear adequate on IUPC and calculate at at least 200 [place] units in 10 minutes.     Assessment and plan:   Patient is a 30 year old  at 41w4d here for induction of labor 2/2 post SUZANNE.      Induction of labor    -SVE as above. Was 9 cm at  1715.   -Repeat SVE in 1 hour.   -Continue pitocin.      Plan of care discussed with the patient.     Dr. Dayne Rodney MD    [facility]     This note was created by [employer] voice recognition. Errors in content may be related to improper recognition by the system; efforts to review and correct have been done but errors may still exist. Please be advised the primary purpose of this note is for me to communicate medical care. Standard sentence structure is not always used. Medical terminology and medical abbreviations may be used. There may be grammatical, typographical, and automated fill ins that may have errors missed in proofreading.

## 2025-06-13 NOTE — ANESTHESIA PREPROCEDURE EVALUATION
Anesthesia PreOp Note    HPI:     Luisa Ulloa is a 30 year old female who presents for preoperative consultation requested by: * No surgeons listed *    Date of Surgery: 6/12/2025    * No procedures listed *  Indication: * No pre-op diagnosis entered *    Relevant Problems   No relevant active problems       NPO:                         History Review:  Patient Active Problem List    Diagnosis Date Noted    Pregnant (Formerly [facility]) 06/10/2025    Refusal of treatment for reasons of Druze 03/24/2025    Supervision of normal pregnancy (Formerly [facility]) 12/19/2024    Recurrent low back pain 10/13/2023       Past Medical History[1]    Past Surgical History[2]    Prescriptions Prior to Admission[3]  Current Medications and Prescriptions Ordered in Epic[4]    Allergies[5]    Family History[6]  Social Hx on file[7]    Available pre-op labs reviewed.  Lab Results   Component Value Date    WBC 10.2 06/10/2025    RBC 4.00 06/10/2025    HGB 11.2 (L) 06/10/2025    HCT 34.3 (L) 06/10/2025    MCV 85.8 06/10/2025    MCH 28.0 06/10/2025    MCHC 32.7 06/10/2025    RDW 13.9 06/10/2025    .0 06/10/2025             Vital Signs:  Body mass index is 32.27 kg/m².   height is 1.626 m (5' 4\") and weight is 85.3 kg (188 lb). Her oral temperature is 97.7 °F (36.5 °C). Her blood pressure is 112/43 and her pulse is 76. Her respiration is 18.   Vitals:    06/12/25 1800 06/12/25 1900 06/12/25 1922 06/12/25 1926   BP: 118/61 129/61  112/43   Pulse: 70 75  76   Resp:    18   Temp:   97.7 °F (36.5 °C)    TempSrc:   Oral    Weight:       Height:            Anesthesia Evaluation     Patient summary reviewed and Nursing notes reviewed    Airway   Mallampati: II  TM distance: >3 FB  Neck ROM: full  Dental      Pulmonary - negative ROS and normal exam   Cardiovascular - negative ROS and normal exam  Exercise tolerance: good    Neuro/Psych - negative ROS     GI/Hepatic/Renal - negative ROS     Endo/Other - negative ROS   Abdominal  - normal exam     Other  findings: Chronic low back pain            Anesthesia Plan:   ASA:  2  Plan:   General and epidural  Airway:  ETT  Post-op Pain Management: Continuous epidural, IV analgesics and Intrathecal narcotics  Informed Consent Plan and Risks Discussed With:  Patient  Use of Blood Products Discussed With:  Patient  Blood Product Use Consented        I have informed Luisa Ulloa and/or legal guardian or family member of the nature of the anesthetic plan, benefits, risks including possible dental damage if relevant, major complications, and any alternative forms of anesthetic management.   All of the patient's questions were answered to the best of my ability. The patient desires the anesthetic management as planned.  Ayaka Schmidt MD  2025 9:46 PM  Present on Admission:  **None**           [1]   Past Medical History:   Patient denies medical problems   [2]   Past Surgical History:  Procedure Laterality Date    Lasik  2021    Patient denies any surgical history     [3]   Medications Prior to Admission   Medication Sig Dispense Refill Last Dose/Taking    [] clotrimazole 2 % Vaginal Cream Place 1 Application vaginally nightly for 3 days. 21 g 0     Prenatal MV-Min-Fe Fum-FA-DHA (PRENATAL 1 OR) Take by mouth.      [4]   Current Facility-Administered Medications Ordered in Epic   Medication Dose Route Frequency Provider Last Rate Last Admin    oxyTOCIN in sodium chloride 0.9% (Pitocin) 30 Units/500mL infusion premix  0.5-20 saumya-units/min Intravenous Continuous Diandra Tesfaye MD 16 mL/hr at 25 16 saumya-units/min at 25    fentaNYL-bupivacaine 2 mcg/mL-0.125% in sodium chloride 0.9% 100 mL EPIDURAL infusion premix   Epidural Continuous Ayaka Schmidt MD        fentaNYL (Sublimaze) 50 mcg/mL injection 100 mcg  100 mcg Epidural Once Ayaka Schmidt MD        bupivacaine PF (Marcaine) 0.25% injection  20 mL Epidural Once Ayaka Schmidt MD        ePHEDrine (PF) 25 MG/5 ML injection 5 mg  5  mg Intravenous PRN Ayaka Schmidt MD        nalbuphine (Nubain) 10 mg/mL injection 2.5 mg  2.5 mg Intravenous Q15 Min PRN Ayaka Schmidt MD        dextrose in lactated ringers 5% infusion   Intravenous Continuous PRN Dubabdon, Cassidy T, DO   Stopped at 06/12/25 0805    lactated ringers infusion   Intravenous PRN Dubabdon, Cassidy T,  mL/hr at 06/12/25 2140 Rate Change at 06/12/25 2140    lactated ringers IV bolus 500 mL  500 mL Intravenous PRN Dubabdon, Cassidy T, DO   Stopped at 06/11/25 0334    acetaminophen (Tylenol Extra Strength) tab 500 mg  500 mg Oral Q6H PRN Dubyel, Cassidy T, DO        acetaminophen (Tylenol Extra Strength) tab 1,000 mg  1,000 mg Oral Q6H PRN Dubyel, Cassidy T, DO        ibuprofen (Motrin) tab 600 mg  600 mg Oral Once PRN Dubabdon, Cassidy T, DO        ondansetron (Zofran) 4 MG/2ML injection 4 mg  4 mg Intravenous Q6H PRN Dubabdon, Cassidy T, DO   4 mg at 06/12/25 1549    oxyTOCIN in sodium chloride 0.9% (Pitocin) 30 Units/500mL infusion premix  62.5-900 saumya-units/min Intravenous Continuous Dubabdon, Cassidy T, DO   Held at 06/10/25 2130    terbutaline (Brethine) 1 MG/ML injection 0.25 mg  0.25 mg Subcutaneous PRN Josee, Cassidy T, DO        sodium citrate-citric acid (Bicitra) 500-334 MG/5ML oral solution 30 mL  30 mL Oral PRN Dubabdon, Cassidy T, DO        lidocaine PF (Xylocaine-MPF) 1% injection  30 mL Intradermal Once Dubyel, Cassidy T, DO        nalbuphine (Nubain) 10 mg/mL injection 10 mg  10 mg Intramuscular Q4H PRN Dubabdon, Cassidy T, DO   10 mg at 06/12/25 1208    hydrOXYzine 50 mg/mL injection 50 mg  50 mg Intramuscular Q4H PRN Dubabdon, Cassidy T, DO   50 mg at 06/12/25 1207    fentaNYL (Sublimaze) 50 mcg/mL injection 50 mcg  50 mcg Intravenous Q30 Min PRN Dubabdon, Cassidy T, DO   50 mcg at 06/12/25 2279     No current Epic-ordered outpatient medications on file.   [5]   Allergies  Allergen Reactions    Shrimp UNKNOWN     Unsure; dx via allergy testing   [6]   Family History  Problem Relation Age of Onset    No Known Problems  Father     Lipids Mother     Other (Other) Mother         thyroid issue.    Asthma Mother     Hypertension Mother     Hypertension Sister     Psychiatric Sister         Depression   [7]   Social History  Socioeconomic History    Marital status:    Occupational History    Occupation: [employer]   Tobacco Use    Smoking status: Never    Smokeless tobacco: Never   Vaping Use    Vaping status: Every Day    Substances: Nicotine   Substance and Sexual Activity    Alcohol use: Never    Drug use: Not Currently    Sexual activity: Not Currently   Other Topics Concern    Blood Transfusions No

## 2025-06-13 NOTE — LACTATION NOTE
This note was copied from a baby's chart.     06/13/25 8727   Evaluation Type   Evaluation Type Inpatient   Problems & Assessment   Problems Diagnosed or Identified Latch difficulty   Infant Assessment Hunger cues present;Skin color: pink or appropriate for ethnicity   Muscle tone Appropriate for GA   Feeding Assessment   Summary Current Feeding Breastfeeding with formula supplement   Last 24 hour feeding summary See I and O   Breastfeeding Assessment Assisted with breastfeeding w/mother's permission;Deep latch achieved and observed;Tolerated feeding well;Nipple shield initiated with non-nutritive suckling   Breastfeeding lasted # of minutes 10   Breastfeeding Positions cross cradle;right breast   Latch 1   Audible Sucks/Swallows 1   Type of Nipple 2   Comfort (Breast/Nipple) 2   Hold (Positioning) 1   LATCH Score 7   Other (comment) Assisted with latch on cross cradle on the right breast. Able to achieve a deep latch with suckling bursts with a nipple shield. Supplement to follow.   Pre/Post Weights   Supplement Type Formula   Supplement Type (other) Gentlease   Equipment used   Equipment used Bottle with slow flow nipple

## 2025-06-13 NOTE — LACTATION NOTE
06/13/25 1415   Evaluation Type   Evaluation Type Inpatient   Problems identified   Problems identified Knowledge deficit   Maternal history   Maternal history Caesarean section;Induction of labor   Breastfeeding goal   Breastfeeding goal To maintain breast milk feeding per patient goal   Maternal Assessment   Bilateral Breasts Pendulous;Soft;Symmetrical   Bilateral Nipples Slightly everted/short;Colostrum easily expressed   Right Areola Edema   Left Areola Edema   Breastfeeding Assistance Breastfeeding assistance provided with permission;Breast exam provided with permission;Hand expression provided with permission;Pumping assistance provided with permission   Pain assessment   Location/Comment Denies   Treatment of Sore Nipples Deeper latch techniques;Expressed breast milk;Lanolin   Guidelines for use of:   Breast pump type Ameda Platinum;Spectra   Current use of pump: Set up with pump   Suggested use of pump Pump after nursing if a nipple shield is used;Pump if infant is not latching to breast;Pump each time a supplement is offered   Other (comment) Assisted with Ameda set up.  Reviewed pump settings and guidelines.  Assisted with latch on cross cradle on the right breast.  Able to achieve a deep latch with suckling bursts with a nipple shield.  Mom with areolar edema.     Reviewed hand expression, breast massage/compression, deeper latch techniques, skin to skin benefits, typical I and O of infant, and waking techniques.  Pumping/supplementation guidelines reviewed.  Support provided, encouraged to reach out with further questions.

## 2025-06-13 NOTE — PROGRESS NOTES
Patient transferred to mother/baby room 350 per cart in stable condition with baby and personal belongings.  Accompanied by  and staff.  Report given to Khadijah mother/baby RN.

## 2025-06-13 NOTE — PROGRESS NOTES
Received patient into room via cart and accompanied by significant other as well as nursing staff.  Pt transferred to bed. VSS WNL. ID bands matched with L&D RN. Patient oriented to unit, room, and call light within reach. POC reviewed with patient. Report received from JEYSON Flores.

## 2025-06-13 NOTE — ANESTHESIA POST-OP FOLLOW-UP NOTE
[facility]  Anesthesiology Epidural Follow-up Note  2025    Patient name: Luisa Ulloa 30 year old female  : 1995  MRN: [id number]    Diagnosis: No diagnosis found.    S/P:C/section  Pain treatment modality: Duramorph    Current hospital day: Hospital Day: 4    Pain Scores: 2      Current Medications:  Scheduled Meds:Scheduled Medications[1]  Continuous Infusions:Medication Infusions[2]  PRN Meds:.PRN Medications[3]        Assessment:  Doing well   No Complications    Plan:  PO Pain Meds    TRAMAINE HANEY MD  Anesthesia Acute Pain Service [phone]               [1]    acetaminophen  1,000 mg Oral Q6H    ketorolac  30 mg Intravenous Q6H    [START ON 2025] ibuprofen  600 mg Oral Q6H    docusate sodium  100 mg Oral BID@0600,1800   [2]    oxyTOCIN in sodium chloride 0.9% 62.5 saumya-units/min (25 1320)   [3]   gabapentin    magnesium hydroxide    bisacodyl    ondansetron    witch hazel-glycerin    phenylephrine-min oil-vijaya    simethicone    ammonia aromatic    HYDROmorphone    oxyCODONE    ePHEDrine (PF)    nalbuphine    naloxone    acetaminophen    HYDROcodone-acetaminophen    HYDROcodone-acetaminophen    HYDROmorphone    HYDROmorphone    diphenhydrAMINE **OR** diphenhydrAMINE    nalbuphine    nalbuphine    hydrOXYzine    [COMPLETED] fentaNYL **FOLLOWED BY** fentaNYL

## 2025-06-13 NOTE — PLAN OF CARE
Problem: Patient Centered Care  Goal: Patient preferences are identified and integrated in the patient's plan of care  Description: Interventions:  - What would you like us to know as we care for you?   - Provide timely, complete, and accurate information to patient/family  - Incorporate patient and family knowledge, values, beliefs, and cultural backgrounds into the planning and delivery of care  - Encourage patient/family to participate in care and decision-making at the level they choose  - Honor patient and family perspectives and choices  Outcome: Progressing     Problem: Patient/Family Goals  Goal: Patient/Family Long Term Goal  Description: Patient's Long Term Goal:     Interventions:  -   - See additional Care Plan goals for specific interventions  Outcome: Progressing  Goal: Patient/Family Short Term Goal  Description: Patient's Short Term Goal:     Interventions:   -   - See additional Care Plan goals for specific interventions  Outcome: Progressing     Problem: GENITOURINARY - ADULT  Goal: Absence of urinary retention  Description: INTERVENTIONS:  - Assess patient’s ability to void and empty bladder  - Monitor intake/output and perform bladder scan as needed  - Follow urinary retention protocol/standard of care  - Consider collaborating with pharmacy to review patient's medication profile  - Implement strategies to promote bladder emptying  Outcome: Progressing     Problem: POSTPARTUM  Goal: Long Term Goal:Experiences normal postpartum course  Description: INTERVENTIONS:  - Assess and monitor vital signs and lab values.  - Assess fundus and lochia.  - Provide ice/sitz baths for perineum discomfort.  - Monitor healing of incision/episiotomy/laceration, and assess for signs and symptoms of infection and hematoma.  - Assess bladder function and monitor for bladder distention.  - Provide/instruct/assist with pericare as needed.  - Provide VTE prophylaxis as needed.  - Monitor bowel function.  - Encourage  ambulation and provide assistance as needed.  - Assess and monitor emotional status and provide social service/psych resources as needed.  - Utilize standard precautions and use personal protective equipment as indicated. Ensure aseptic care of all intravenous lines and invasive tubes/drains.  - Obtain immunization and exposure to communicable diseases history.  Outcome: Progressing  Goal: Optimize infant feeding at the breast  Description: INTERVENTIONS:  - Initiate breast feeding within first hour after birth.   - Monitor effectiveness of current breast feeding efforts.  - Assess support systems available to mother/family.  - Identify cultural beliefs/practices regarding lactation, letdown techniques, maternal food preferences.  - Assess mother's knowledge and previous experience with breast feeding.  - Provide information as needed about early infant feeding cues (e.g., rooting, lip smacking, sucking fingers/hand) versus late cue of crying.  - Discuss/demonstrate breast feeding aids (e.g., infant sling, nursing footstool/pillows, and breast pumps).  - Encourage mother/other family members to express feelings/concerns, and actively listen.  - Educate father/SO about benefits of breast feeding and how to manage common lactation challenges.  - Recommend avoidance of specific medications or substances incompatible with breast feeding.  - Assess and monitor for signs of nipple pain/trauma.  - Instruct and provide assistance with proper latch.  - Review techniques for milk expression (breast pumping) and storage of breast milk. Provide pumping equipment/supplies, instructions and assistance, as needed.  - Encourage rooming-in and breast feeding on demand.  - Encourage skin-to-skin contact.  - Provide LC support as needed.  - Assess for and manage engorgement.  - Provide breast feeding education handouts and information on community breast feeding support.   Outcome: Progressing  Goal: Establishment of adequate milk  supply with medication/procedure interruptions  Description: INTERVENTIONS:  - Review techniques for milk expression (breast pumping).   - Provide pumping equipment/supplies, instructions, and assistance until it is safe to breastfeed infant.  Outcome: Progressing  Goal: Appropriate maternal -  bonding  Description: INTERVENTIONS:  - Assess caregiver- interactions.  - Assess caregiver's emotional status and coping mechanisms.  - Encourage caregiver to participate in  daily care.  - Assess support systems available to mother/family.  - Provide /case management support as needed.  Outcome: Progressing

## 2025-06-13 NOTE — PROGRESS NOTES
Labor Progress Note  Subjective:   Patient comfortable with epidural.     Objective:   Vitals:    25 2202   BP: 105/44   Pulse: 78   Resp:    Temp:         SVE: 9/80/-1     FHR: Baseline 150 BPM, Minimal variability, no accelerations, early and late decelerations   [place]: contractions Q 3-5 min not adequate.      Assessment and plan:   Patient is a 30 year old  at 41w4d here for induction of labor 2/2 post SUZANNE.      Induction of labor    -SVE as above. No change in SVE since 5. Had 4 hours of adequate contractions then not adequate. Also now with fetal intolerance to labor. Recommend  section. Patient understood and agreed. -  - Verbally consented to risks. Discussed other possible techniques and recommended against pitocin break, and PO Cytotec due to fetal intolerance at this time. Patient and partner understood and agreed to  section.       Plan of care discussed with the patient.     Dr. Dayne Rodney MD    [facility]     This note was created by [employer] voice recognition. Errors in content may be related to improper recognition by the system; efforts to review and correct have been done but errors may still exist. Please be advised the primary purpose of this note is for me to communicate medical care. Standard sentence structure is not always used. Medical terminology and medical abbreviations may be used. There may be grammatical, typographical, and automated fill ins that may have errors missed in proofreading.

## 2025-06-14 NOTE — LACTATION NOTE
06/14/25 1203   Evaluation Type   Evaluation Type Inpatient   Problems identified   Problems identified Knowledge deficit   Maternal history   Maternal history Caesarean section;Induction of labor   Breastfeeding goal   Breastfeeding goal To maintain breast milk feeding per patient goal   Maternal Assessment   Bilateral Breasts   (did not assess at time of visit)   Bilateral Nipples   (did not assess at time of visit.)   Prior breastfeeding experience (comment below) Primip   Breastfeeding Assistance Breastfeeding assistance provided with permission;Breast exam provided with permission;Hand expression provided with permission;Pumping assistance provided with permission   Pain assessment   Location/Comment Denies   Guidelines for use of:   Breast pump type Ameda Platinum;Spectra   Current use of pump: Pumping consistently   Suggested use of pump Pump after nursing if a nipple shield is used;Pump if infant is not latching to breast;Pump each time a supplement is offered   Other (comment) LC assistance offered and mother declined. Mother just had a few questions regarding milk production, supplementation guidelines, and pumping guidelines. Mother states she has been pumping consistently and starting to see more milk. Outpatient information reviewed and all questions answered.

## 2025-06-14 NOTE — PROGRESS NOTES
POSTPARTUM PROGRESS NOTE  Date: 2025    Subjective: Patient feeling well this AM. Pain is well-controlled. Tolerating regular diet. Ambulating, voiding, having bowel movements without issue.     Vitals  Vitals:    25 1730 25 2148 25 2151 25 2200   BP: 99/58  106/58 106/58   BP Location: Right arm      Pulse: 75  82 82   Resp: 16 16     Temp: 98.7 °F (37.1 °C) 98.3 °F (36.8 °C)     TempSrc: Oral Oral     SpO2:       Weight:       Height:             PHYSICAL EXAM  General: resting in bed, no acute distress  Chest: no increasing work of breathing  Abdomen: fundus firm, incision c/d/I with Dermabond and Tegaderm  Genitourinary: minimal lochia  Extremities: lower extremities symmetric bilaterally     A/P: Luisa Ulloa is a 30 year old  POD#2 s/p pLTCS at 41w4d for arrest of dilation.     #Routine postpartum care  -Pain: Tylenol and Motrin scheduled, gabapentin PRN for breakthrough pain  -Hemodynamics:   ---acute blood loss with anemia  ---11.2 > 744mL > 8.6  ---iron supplementation: venofer ordered   -Infant: baby boy doing well  -Infant feeding: breast milk  -Circumcision: CONSENT FOR  CIRCUMCISION    Discussed with the infant's mother that this is an elective procedure. Discussed AAP position on circumcision, discussed it may lower risk of UTI and STI, has not been proven to interfere with sexual functioning. Discussed risks including bleeding, infection, injury to the penis and possible need for re-operation. All questions answered.       #Prenatal issues to follow - none    Disposition: discharge home today    Carol Ann Patricia DO

## 2025-06-14 NOTE — DISCHARGE SUMMARY
[facility]  part of [facility]    Discharge Summary    uLisa Ulloa Patient Status:  Inpatient    1995 MRN [id number]   Location [facility] 3SE Attending Dayne Rodney MD   Hosp Day # 4 PCP No primary care provider on file.     Date of Admission: 6/10/2025    Date of Discharge: 2025    Admission Diagnoses:   Live IUP at 41w4d    Primary OB Clinician: [facility]    Hospital Course:     EDC: Estimated Date of Delivery: 25    Gestational Age: 41w4d    Date of Delivery: 2025    Antepartum complications: None    Intrapartum Complications: Arrest of dilation    Delivered By: Dr. Rodney     Delivery Type: primary low transverse  section    Tubal Ligation: n/a    Baby: Liveborn male,     Apgars:  1 minute:   7                 5 minutes: 8             Anesthesia: epidural      Surgical Procedures       Case IDs Date Procedure Surgeon Location Status    [id number] 25  SECTION Dayne Rodney MD [facility] L+D OR [facility]            Laceration: n/a    Episiotomy: none    Placenta: spontaneous    Feeding Method: breast fed    Rh Immune Globulin Given: no    Pending Labs       Order Current Status    Specimen to Pathology Tissue In process            Discharge Plan:   Discharge Condition: Good  Early Discharge:  NO    Discharge medications:  Current Discharge Medication List        New Orders    Details   acetaminophen 500 MG Oral Tab Take 2 tablets (1,000 mg total) by mouth every 6 (six) hours.      gabapentin 300 MG Oral Cap Take 1 capsule (300 mg total) by mouth every 8 (eight) hours as needed (For breakthrough moderate pain).      ibuprofen 600 MG Oral Tab Take 1 tablet (600 mg total) by mouth every 6 (six) hours.           Home Meds - Unchanged    Details   Prenatal MV-Min-Fe Fum-FA-DHA (PRENATAL 1 OR) Take by mouth.                   Discharge Diet: As tolerated    Discharge Activity: Pelvic rest until cleared    Follow up:      Follow-up Information        [facility]. Call.    Specialty: Pediatrics  Why: As needed  Contact information:  [address] [place] [address]  [phone]  Additional information:  Your appointment is scheduled at [facility] - [address] 2nd floor [place], [place]   Reserved parking is available for the Breastfeeding Center in parking lot B off of [address]. Parking lot B is the parking lot closest to the Breastfeeding Center building.      What to Bring to your Appointment:      Referrals-   If your insurance requires a referral, you will need a referral from your OB physician for yourself and a referral from your baby’s physician for baby.      Fax referrals to the Breastfeeding Center at [phone]   Baby and All Supplies-   Adjust your feeding schedule on the day of your appointment so baby is hungry at the time of your appointment. This usually means to NOT feed for at least 2 - 3 hours prior to your appointment      Please bring ALL equipment you are using (such as a breast pump, pump parts, nipple shield, etc).      If you are supplementing your baby, bring enough expressed breastmilk or formula for a full feeding, and the method you are using for feeding your baby.      Masks are optional for all patients and visitors, unless otherwise indicated.             [facility]. Call.    Specialty: Pediatrics  Why: As needed  Contact information:  [address]  [facility] [address] [phone]  Additional information:  Masks are optional for all patients and visitors, unless otherwise indicated.             Carol Ann Patricia DO Follow up.    Specialty: OBSTETRICS & GYNECOLOGY  Why: Incision check will be scheduled with a female provider  Contact information:  [address] [facility]  [address] [place] [address] [phone]                             Follow up:   2wk incision check  6wk postpartum visit         Other Discharge Instructions:          Guidelines for Using a Nipple Shield    Refer to the ’s instructions. These are additional suggestions only.  This thin silicone nipple shield has been recommended to assist your baby to latch on to the breast or for protection of your nipples while your baby learns to breastfeed correctly.  Use of the shield is considered temporary, allowing you to begin breastfeeding your baby. Some infants have  successfully using the shield for longer periods, however, checking your infant’s weight regularly is recommended to assure adequate growth while using the nipple shield.    Cautions regarding nipple shield use:  The use of a nipple shield may decrease your milk supply and could decrease the amount of milk your baby receives.  Careful follow-up, including weight checks, with your lactation consultant and baby’s health care provider is important.   Do not use a different nipple shield.     Before feeding your baby:  Rinse the shield in warm water to make it          softer and easier to adhere to the breast.  Apply nipple shield correctly:               Hold the shield by the rim, turn it inside-out alf. Place the shield, centered over the                 nipple and flip the shield right side out, drawing your nipple and areola into the                shield as much as possible.  Massage breasts and if possible, hand express some breastmilk into the nipple shield.     Latching your baby on to the breast:  Stroke your baby’s lower lip with the nipple of the shield. Wait for your baby to open wide like a “yawn,” with the tongue down and out over the lower lip. Bring baby’s mouth directly over the shield. It may take a few attempts before your baby settles into a rhythmical suckling pattern.  After several minutes of regular swallowing at the breast, you may want to try to reattach your baby to your breast without the shield.  When your baby’s swallowing slows on the first side, repeat this process  on the other breast.   If needed, trickle drops of your expressed milk or formula onto the nipple to encourage your baby to latch on. If your baby becomes upset or has not latched on within 15 minutes, stop and feed your baby using another method.    Signs of correct, effective suckling include:  Your baby swallows with nearly every suck (this is called nutritive suckling: swallowing every 1-3 sucks)  Your baby sustains nutritive suck and swallow pattern for at least 15-30 minutes, offer both breasts at each feeding.  Your nipples are not painful during the feeding.  Your baby is content after most feedings.  Your baby has adequate diapers (at least 6-8 wet and 3-4 loose, yellow stools every 24 hours by the 4th day of life) AND gains at least 4-8 ounces per week (one-half to one ounce per day). Use the breastfeeding journal to record your baby’s feedings and diapers.    Is a supplement needed?  If your baby does not latch on, or swallows less than 15-30 minutes at a feeding - swallowing after most sucks (at least every 1-3 sucks), feed your baby additional expressed breastmilk or formula by  wide base slow flow bottle with 1 to 2 +oz to satisfaction.                 After feeding your baby:  Pump your breasts for 10-15 minutes using a hospital grade rental breast pump, after most daytime feedings. This may help maintain adequate milk supply while using the shield. If your baby is not latching on yet, pump at least 8-12 times each 24 hours.  Wash the nipple shield in hot soapy water, rinse and air dry. The shield may be boiled.     Follow-up is VERY important!  Let your baby’s health care provider know immediately if it is difficult for you to feed your baby or if the number of wet or stool diapers is inadequate.   Follow-up visits with your lactation consultant and baby’s health care provider are necessary when using the shield.   Your baby’s weight should be checked 1-2 times each week while using a nipple  cailin.        Carol Ann Patricia, DO

## 2025-06-16 NOTE — TELEPHONE ENCOUNTER
Outgoing call to patient to switch her appointment with a female provider per   ,a detail message was left to call the office .

## 2025-06-22 NOTE — PROGRESS NOTES
Spoke with pt. Took off bandage and noted area smaller than pinky where wound is draining pink/yellow fluid. Denies pus, f/c. Denies significant pain. Pt advised to purchase gauze to encouraged drainage of the wound. Infection s/sx rev’d for which pt to be seen in ED. Keflex Rx sent to pharmacy. Will message office for pt to be seen early this week. May need to have wound opened further if seroma not improving.

## 2025-06-23 NOTE — TELEPHONE ENCOUNTER
----- Message from Diandra Tesfaye sent at 2025  8:17 PM CDT -----  Regarding: Appointment   Please check with pt regarding issue with  wound. Please schedule pt for wound exam, possible seroma Monday or Tuesday.    Called pt scheduled today with Dr. Patricia and agrees.

## 2025-06-23 NOTE — PROGRESS NOTES
POSTPARTUM VISIT     HPI:   Luisa Ulloa is a 30 year old  female presenting for postpartum visit.     She is s/p pLTCS at 41w4d for arrest of dilation. Today she reports:     Pain: well-controlled with medication dosing q6hrs  Bleeding: improving, occasional small clots but never soaking a pad  Infant feeding: breast milk, struggling with pain due to aggressive latch   Mood: no concerns    Medications (Active prior to today's visit):  Current Medications[1]    Allergies:  Allergies[2]    Ht 64\"   Wt 169 lb 3.2 oz (76.7 kg)   LMP 2024 (Exact Date)   Breastfeeding Yes   BMI 29.04 kg/m²     PHYSICAL EXAM:   GENERAL: Well developed, well nourished, in no apparent distress  BREASTS: symmetric bilaterally, small laceration at the top of the right nipple without active bleeding or erythema, left breast normal  ABDOMEN: Soft, incision intact with exception of 1-2cm elliptical opening to the right of midline without surrounding erythema or drainage, underlying fascia intact when probed with cotton swab to about 1cm depth  EXTREMITIES: nontender without edema      ASSESSMENT/PLAN:       #Postpartum visit  -Pain: well-controlled  -Incision: recommend gently packing defect to allow for healing by secondary intention, wound care reviewed  -Mood: no concerns  -Breastfeeding: recommend reaching out to lactation consultant regarding latch  -Contraception: to be discussed at follow up visit     Follow up 1-2wk to reassess wound     Carol Ann Patricia DO               [1]   Current Outpatient Medications   Medication Sig Dispense Refill    cephALEXin 500 MG Oral Cap Take 1 capsule (500 mg total) by mouth 4 (four) times daily for 7 days. 28 capsule 0    acetaminophen 500 MG Oral Tab Take 2 tablets (1,000 mg total) by mouth every 6 (six) hours. 30 tablet 0    gabapentin 300 MG Oral Cap Take 1 capsule (300 mg total) by mouth every 8 (eight) hours as needed (For breakthrough moderate pain). 30 capsule 0     ibuprofen 600 MG Oral Tab Take 1 tablet (600 mg total) by mouth every 6 (six) hours. 30 tablet 0    Prenatal MV-Min-Fe Fum-FA-DHA (PRENATAL 1 OR) Take by mouth.     [2]   Allergies  Allergen Reactions    Shrimp UNKNOWN     Unsure; dx via allergy testing

## 2025-06-28 NOTE — PROGRESS NOTES
Reviewed self and infant care with mom, she verbalizes understanding of instruction reviewed. Mother requested phone number for [facility] lact[facility] office- number provided. Encouraged to follow up with MD's as directed with questions/concerns.

## 2025-07-18 NOTE — PROGRESS NOTES
POSTPARTUM VISIT     HPI:   Luisa Ulloa is a 30 year old  female presenting for postpartum visit.     She is s/p pLTCS at 41w4d for arrest of dilation. Today she reports:     Pain: none  Bleeding: minimal lochia, yellow, questionable red spotting yesterday but none today  Infant feeding: breast milk, struggling with pain due to aggressive latch but working with lactation consultant  Mood: no concerns    Medications (Active prior to today's visit):  Current Medications[1]    Allergies:  Allergies[2]    LMP 2024 (Exact Date)     PHYSICAL EXAM:   GENERAL: Well developed, well nourished, in no apparent distress  ABDOMEN: Soft, incision intact, previous area of skin separation is closed  EXTREMITIES: nontender without edema      ASSESSMENT/PLAN:       #Postpartum visit  -Pain: none  -Incision: well-healed  -Mood: no concerns, EPDS 3 today  -Breastfeeding: recommend reaching out to lactation consultant regarding latch  -Contraception: condoms - discussed recommended inter-pregnancy interval, risks of PTB and PPH with short interval pregnancy, 85% efficacy of condoms at preventing pregnancy    Follow up for annual exam     Carol Ann Patricia DO               [1]   Current Outpatient Medications   Medication Sig Dispense Refill    acetaminophen 500 MG Oral Tab Take 2 tablets (1,000 mg total) by mouth every 6 (six) hours. 30 tablet 0    gabapentin 300 MG Oral Cap Take 1 capsule (300 mg total) by mouth every 8 (eight) hours as needed (For breakthrough moderate pain). 30 capsule 0    ibuprofen 600 MG Oral Tab Take 1 tablet (600 mg total) by mouth every 6 (six) hours. 30 tablet 0    Prenatal MV-Min-Fe Fum-FA-DHA (PRENATAL 1 OR) Take by mouth.     [2]   Allergies  Allergen Reactions    Shrimp UNKNOWN     Unsure; dx via allergy testing

## (undated) NOTE — LETTER
Date & Time: 2/25/2025, 11:55 AM  Patient: Luisa Ulloa  Encounter Provider(s):    Alecia Fajardo APRN       To Whom It May Concern:    Luisa Ulloa was seen and treated in our department on 2/25/2025. She should not return to work until 02/27/2025 .    If you have any questions or concerns, please do not hesitate to call.        _____________________________  Physician/APC Signature

## (undated) NOTE — LETTER
[facility]  Administration of Anesthesia  ILuisais Artemio agree to be cared for by a physician anesthesiologist alone and/or with a nurse anesthetist, who is specially trained to monitor me and give me medicine to put me to sleep or keep me comfortable during my procedure    I understand that my anesthesiologist and/or anesthetist is not an employee or agent of [facility] or [employer]. He or she works for [facility]    As the patient asking for anesthesia services, I agree to:  Allow the anesthesiologist (anesthesia doctor) to give me medicine and do additional procedures as necessary. Some examples are: Starting or using an “IV” to give me medicine, fluids or blood during my procedure, and having a breathing tube placed to help me breathe when I’m asleep (intubation). In the event that my heart stops working properly, I understand that my anesthesiologist will make every effort to sustain my life, unless otherwise directed by [facility] Do Not Resuscitate documents.  Tell my anesthesia doctor before my procedure:  If I am pregnant.  The last time that I ate or drank.  iii. All of the medicines I take (including prescriptions, herbal supplements, and pills I can buy without a prescription (including street drugs/illegal medications). Failure to inform my anesthesiologist about these medicines may increase my risk of anesthetic complications.  iv.If I am allergic to anything or have had a reaction to anesthesia before.  I understand how the anesthesia medicine will help me (benefits).  I understand that with any type of anesthesia medicine there are risks:  The most common risks are: nausea, vomiting, sore throat, muscle soreness, damage to my eyes, mouth, or teeth (from breathing tube placement).  Rare risks include: remembering what happened during my procedure, allergic reactions to medications, injury to my airway, heart, lungs, vision, nerves,  or muscles and in extremely rare instances death.  My doctor has explained to me other choices available to me for my care (alternatives).  Pregnant Patients (“epidural”):  I understand that the risks of having an epidural (medicine given into my back to help control pain during labor), include itching, low blood pressure, difficulty urinating, headache or slowing of the baby’s heart. Very rare risks include infection, bleeding, seizure, irregular heart rhythms and nerve injury.  Regional Anesthesia (“spinal”, “epidural”, & “nerve blocks”):  I understand that rare but potential complications include headache, bleeding, infection, seizure, irregular heart rhythms, and nerve injury.    _____________________________________________________________________________  Patient (or Representative) Signature/Relationship to Patient  Date   Time    _____________________________________________________________________________   Name (if used)    Language/Organization   Time    _____________________________________________________________________________  Nurse Anesthetist Signature     Date   Time  _____________________________________________________________________________  Anesthesiologist Signature     Date   Time  I have discussed the procedure and information above with the patient (or patient’s representative) and answered their questions. The patient or their representative has agreed to have anesthesia services.    _____________________________________________________________________________  Witness        Date   Time  I have verified that the signature is that of the patient or patient’s representative, and that it was signed before the procedure  Patient Name: Luisa Amor Artemio     : 1995                 Printed: 6/10/2025 at 8:57 PM    Medical Record #: [id number]                                            Page 1 of 1  ----------ANESTHESIA CONSENT----------